# Patient Record
Sex: FEMALE | Race: BLACK OR AFRICAN AMERICAN | Employment: FULL TIME | URBAN - METROPOLITAN AREA
[De-identification: names, ages, dates, MRNs, and addresses within clinical notes are randomized per-mention and may not be internally consistent; named-entity substitution may affect disease eponyms.]

---

## 2017-01-23 ENCOUNTER — OP HISTORICAL/CONVERTED ENCOUNTER (OUTPATIENT)
Dept: OTHER | Age: 62
End: 2017-01-23

## 2017-04-26 ENCOUNTER — OP HISTORICAL/CONVERTED ENCOUNTER (OUTPATIENT)
Dept: OTHER | Age: 62
End: 2017-04-26

## 2017-05-23 ENCOUNTER — OP HISTORICAL/CONVERTED ENCOUNTER (OUTPATIENT)
Dept: OTHER | Age: 62
End: 2017-05-23

## 2017-06-16 ENCOUNTER — OP HISTORICAL/CONVERTED ENCOUNTER (OUTPATIENT)
Dept: OTHER | Age: 62
End: 2017-06-16

## 2017-08-14 ENCOUNTER — OP HISTORICAL/CONVERTED ENCOUNTER (OUTPATIENT)
Dept: OTHER | Age: 62
End: 2017-08-14

## 2018-01-02 ENCOUNTER — OP HISTORICAL/CONVERTED ENCOUNTER (OUTPATIENT)
Dept: OTHER | Age: 63
End: 2018-01-02

## 2018-02-22 ENCOUNTER — ED HISTORICAL/CONVERTED ENCOUNTER (OUTPATIENT)
Dept: OTHER | Age: 63
End: 2018-02-22

## 2018-02-24 ENCOUNTER — ED HISTORICAL/CONVERTED ENCOUNTER (OUTPATIENT)
Dept: OTHER | Age: 63
End: 2018-02-24

## 2018-06-04 ENCOUNTER — OP HISTORICAL/CONVERTED ENCOUNTER (OUTPATIENT)
Dept: OTHER | Age: 63
End: 2018-06-04

## 2018-08-27 ENCOUNTER — OP HISTORICAL/CONVERTED ENCOUNTER (OUTPATIENT)
Dept: OTHER | Age: 63
End: 2018-08-27

## 2018-11-11 ENCOUNTER — ED HISTORICAL/CONVERTED ENCOUNTER (OUTPATIENT)
Dept: OTHER | Age: 63
End: 2018-11-11

## 2018-11-19 ENCOUNTER — ED HISTORICAL/CONVERTED ENCOUNTER (OUTPATIENT)
Dept: OTHER | Age: 63
End: 2018-11-19

## 2019-02-25 ENCOUNTER — OP HISTORICAL/CONVERTED ENCOUNTER (OUTPATIENT)
Dept: OTHER | Age: 64
End: 2019-02-25

## 2019-03-28 ENCOUNTER — ED HISTORICAL/CONVERTED ENCOUNTER (OUTPATIENT)
Dept: OTHER | Age: 64
End: 2019-03-28

## 2019-06-06 ENCOUNTER — HOSPITAL ENCOUNTER (INPATIENT)
Age: 64
LOS: 7 days | Discharge: HOME | DRG: 329 | End: 2019-06-13
Attending: EMERGENCY MEDICINE | Admitting: SURGERY
Payer: COMMERCIAL

## 2019-06-06 ENCOUNTER — ANESTHESIA (OUTPATIENT)
Dept: SURGERY | Age: 64
DRG: 329 | End: 2019-06-06
Payer: COMMERCIAL

## 2019-06-06 ENCOUNTER — APPOINTMENT (OUTPATIENT)
Dept: CT IMAGING | Age: 64
DRG: 329 | End: 2019-06-06
Attending: PHYSICIAN ASSISTANT
Payer: COMMERCIAL

## 2019-06-06 ENCOUNTER — APPOINTMENT (OUTPATIENT)
Dept: GENERAL RADIOLOGY | Age: 64
DRG: 329 | End: 2019-06-06
Attending: PHYSICIAN ASSISTANT
Payer: COMMERCIAL

## 2019-06-06 ENCOUNTER — ANESTHESIA EVENT (OUTPATIENT)
Dept: SURGERY | Age: 64
DRG: 329 | End: 2019-06-06
Payer: COMMERCIAL

## 2019-06-06 DIAGNOSIS — K27.5 PERFORATED ULCER (HCC): Primary | ICD-10-CM

## 2019-06-06 DIAGNOSIS — K25.5 CHRONIC GASTRIC ULCER WITH PERFORATION (HCC): ICD-10-CM

## 2019-06-06 DIAGNOSIS — E44.0 MODERATE PROTEIN-CALORIE MALNUTRITION (HCC): ICD-10-CM

## 2019-06-06 LAB
ALBUMIN SERPL-MCNC: 3.1 G/DL (ref 3.5–5)
ALBUMIN/GLOB SERPL: 0.7 {RATIO} (ref 1.1–2.2)
ALP SERPL-CCNC: 69 U/L (ref 45–117)
ALT SERPL-CCNC: 14 U/L (ref 12–78)
ANION GAP SERPL CALC-SCNC: 8 MMOL/L (ref 5–15)
AST SERPL-CCNC: 17 U/L (ref 15–37)
ATRIAL RATE: 102 BPM
BASOPHILS # BLD: 0 K/UL (ref 0–0.1)
BASOPHILS NFR BLD: 0 % (ref 0–1)
BILIRUB SERPL-MCNC: 0.7 MG/DL (ref 0.2–1)
BUN SERPL-MCNC: 8 MG/DL (ref 6–20)
BUN/CREAT SERPL: 12 (ref 12–20)
CALCIUM SERPL-MCNC: 9.6 MG/DL (ref 8.5–10.1)
CALCULATED P AXIS, ECG09: 66 DEGREES
CALCULATED R AXIS, ECG10: -26 DEGREES
CALCULATED T AXIS, ECG11: 63 DEGREES
CHLORIDE SERPL-SCNC: 100 MMOL/L (ref 97–108)
CO2 SERPL-SCNC: 27 MMOL/L (ref 21–32)
COMMENT, HOLDF: NORMAL
CREAT SERPL-MCNC: 0.67 MG/DL (ref 0.55–1.02)
DIAGNOSIS, 93000: NORMAL
DIFFERENTIAL METHOD BLD: ABNORMAL
EOSINOPHIL # BLD: 0 K/UL (ref 0–0.4)
EOSINOPHIL NFR BLD: 0 % (ref 0–7)
ERYTHROCYTE [DISTWIDTH] IN BLOOD BY AUTOMATED COUNT: 12.7 % (ref 11.5–14.5)
GLOBULIN SER CALC-MCNC: 4.3 G/DL (ref 2–4)
GLUCOSE SERPL-MCNC: 160 MG/DL (ref 65–100)
HCT VFR BLD AUTO: 43.2 % (ref 35–47)
HGB BLD-MCNC: 14 G/DL (ref 11.5–16)
IMM GRANULOCYTES # BLD AUTO: 0 K/UL (ref 0–0.04)
IMM GRANULOCYTES NFR BLD AUTO: 0 % (ref 0–0.5)
LACTATE BLD-SCNC: 1.82 MMOL/L (ref 0.4–2)
LYMPHOCYTES # BLD: 0.4 K/UL (ref 0.8–3.5)
LYMPHOCYTES NFR BLD: 4 % (ref 12–49)
MCH RBC QN AUTO: 29.4 PG (ref 26–34)
MCHC RBC AUTO-ENTMCNC: 32.4 G/DL (ref 30–36.5)
MCV RBC AUTO: 90.8 FL (ref 80–99)
MONOCYTES # BLD: 0.2 K/UL (ref 0–1)
MONOCYTES NFR BLD: 2 % (ref 5–13)
NEUTS SEG # BLD: 8.9 K/UL (ref 1.8–8)
NEUTS SEG NFR BLD: 94 % (ref 32–75)
NRBC # BLD: 0 K/UL (ref 0–0.01)
NRBC BLD-RTO: 0 PER 100 WBC
P-R INTERVAL, ECG05: 138 MS
PLATELET # BLD AUTO: 283 K/UL (ref 150–400)
PMV BLD AUTO: 9.3 FL (ref 8.9–12.9)
POTASSIUM SERPL-SCNC: 4.4 MMOL/L (ref 3.5–5.1)
PROT SERPL-MCNC: 7.4 G/DL (ref 6.4–8.2)
Q-T INTERVAL, ECG07: 332 MS
QRS DURATION, ECG06: 88 MS
QTC CALCULATION (BEZET), ECG08: 432 MS
RBC # BLD AUTO: 4.76 M/UL (ref 3.8–5.2)
RBC MORPH BLD: ABNORMAL
SAMPLES BEING HELD,HOLD: NORMAL
SODIUM SERPL-SCNC: 135 MMOL/L (ref 136–145)
VENTRICULAR RATE, ECG03: 102 BPM
WBC # BLD AUTO: 9.5 K/UL (ref 3.6–11)

## 2019-06-06 PROCEDURE — 77030020782 HC GWN BAIR PAWS FLX 3M -B

## 2019-06-06 PROCEDURE — 96374 THER/PROPH/DIAG INJ IV PUSH: CPT

## 2019-06-06 PROCEDURE — 88305 TISSUE EXAM BY PATHOLOGIST: CPT

## 2019-06-06 PROCEDURE — 71045 X-RAY EXAM CHEST 1 VIEW: CPT

## 2019-06-06 PROCEDURE — 77030037032 HC INSRT SCIS CLICKLLINE DISP STOR -B: Performed by: SURGERY

## 2019-06-06 PROCEDURE — 65270000029 HC RM PRIVATE

## 2019-06-06 PROCEDURE — 74011000258 HC RX REV CODE- 258: Performed by: SURGERY

## 2019-06-06 PROCEDURE — 74011250636 HC RX REV CODE- 250/636

## 2019-06-06 PROCEDURE — 77030008771 HC TU NG SALEM SUMP -A: Performed by: ANESTHESIOLOGY

## 2019-06-06 PROCEDURE — 77030031139 HC SUT VCRL2 J&J -A: Performed by: SURGERY

## 2019-06-06 PROCEDURE — 76210000016 HC OR PH I REC 1 TO 1.5 HR: Performed by: SURGERY

## 2019-06-06 PROCEDURE — C9290 INJ, BUPIVACAINE LIPOSOME: HCPCS | Performed by: SURGERY

## 2019-06-06 PROCEDURE — 99285 EMERGENCY DEPT VISIT HI MDM: CPT

## 2019-06-06 PROCEDURE — 77030020061 HC IV BLD WRMR ADMIN SET 3M -B: Performed by: ANESTHESIOLOGY

## 2019-06-06 PROCEDURE — 77030011640 HC PAD GRND REM COVD -A: Performed by: SURGERY

## 2019-06-06 PROCEDURE — 77030013079 HC BLNKT BAIR HGGR 3M -A: Performed by: SURGERY

## 2019-06-06 PROCEDURE — 77030008756 HC TU IRR SUC STRY -B: Performed by: SURGERY

## 2019-06-06 PROCEDURE — 36415 COLL VENOUS BLD VENIPUNCTURE: CPT

## 2019-06-06 PROCEDURE — 76060000034 HC ANESTHESIA 1.5 TO 2 HR: Performed by: SURGERY

## 2019-06-06 PROCEDURE — 3E1M38Z IRRIGATION OF PERITONEAL CAVITY USING IRRIGATING SUBSTANCE, PERCUTANEOUS APPROACH: ICD-10-PCS | Performed by: SURGERY

## 2019-06-06 PROCEDURE — 93005 ELECTROCARDIOGRAM TRACING: CPT

## 2019-06-06 PROCEDURE — 96375 TX/PRO/DX INJ NEW DRUG ADDON: CPT

## 2019-06-06 PROCEDURE — 77030018836 HC SOL IRR NACL ICUM -A: Performed by: SURGERY

## 2019-06-06 PROCEDURE — 74011250636 HC RX REV CODE- 250/636: Performed by: ANESTHESIOLOGY

## 2019-06-06 PROCEDURE — 76010000153 HC OR TIME 1.5 TO 2 HR: Performed by: SURGERY

## 2019-06-06 PROCEDURE — C9113 INJ PANTOPRAZOLE SODIUM, VIA: HCPCS | Performed by: SURGERY

## 2019-06-06 PROCEDURE — 85025 COMPLETE CBC W/AUTO DIFF WBC: CPT

## 2019-06-06 PROCEDURE — 83605 ASSAY OF LACTIC ACID: CPT

## 2019-06-06 PROCEDURE — 74011000250 HC RX REV CODE- 250

## 2019-06-06 PROCEDURE — 77030020263 HC SOL INJ SOD CL0.9% LFCR 1000ML: Performed by: SURGERY

## 2019-06-06 PROCEDURE — 77030020747 HC TU INSUF ENDOSC TELE -A: Performed by: SURGERY

## 2019-06-06 PROCEDURE — 77030032490 HC SLV COMPR SCD KNE COVD -B

## 2019-06-06 PROCEDURE — 77030002996 HC SUT SLK J&J -A: Performed by: SURGERY

## 2019-06-06 PROCEDURE — 74176 CT ABD & PELVIS W/O CONTRAST: CPT

## 2019-06-06 PROCEDURE — 77030034850: Performed by: SURGERY

## 2019-06-06 PROCEDURE — 77030002916 HC SUT ETHLN J&J -A: Performed by: SURGERY

## 2019-06-06 PROCEDURE — 0DU947Z SUPPLEMENT DUODENUM WITH AUTOLOGOUS TISSUE SUBSTITUTE, PERCUTANEOUS ENDOSCOPIC APPROACH: ICD-10-PCS | Performed by: SURGERY

## 2019-06-06 PROCEDURE — 77030012405 HC DRN WND ADLR -A: Performed by: SURGERY

## 2019-06-06 PROCEDURE — 77030022704 HC SUT VLOC COVD -B: Performed by: SURGERY

## 2019-06-06 PROCEDURE — 77030026438 HC STYL ET INTUB CARD -A: Performed by: NURSE ANESTHETIST, CERTIFIED REGISTERED

## 2019-06-06 PROCEDURE — 77030013079 HC BLNKT BAIR HGGR 3M -A: Performed by: ANESTHESIOLOGY

## 2019-06-06 PROCEDURE — 96376 TX/PRO/DX INJ SAME DRUG ADON: CPT

## 2019-06-06 PROCEDURE — 77030034628 HC LIGASURE LAP SEAL DIV MD COVD -F: Performed by: SURGERY

## 2019-06-06 PROCEDURE — 74011250636 HC RX REV CODE- 250/636: Performed by: SURGERY

## 2019-06-06 PROCEDURE — 74011000250 HC RX REV CODE- 250: Performed by: SURGERY

## 2019-06-06 PROCEDURE — 77030019908 HC STETH ESOPH SIMS -A: Performed by: ANESTHESIOLOGY

## 2019-06-06 PROCEDURE — 77030013567 HC DRN WND RESERV BARD -A: Performed by: SURGERY

## 2019-06-06 PROCEDURE — 77030008606 HC TRCR ENDOSC KII AMR -B: Performed by: SURGERY

## 2019-06-06 PROCEDURE — 74011250636 HC RX REV CODE- 250/636: Performed by: PHYSICIAN ASSISTANT

## 2019-06-06 PROCEDURE — 77030008684 HC TU ET CUF COVD -B: Performed by: ANESTHESIOLOGY

## 2019-06-06 PROCEDURE — 96361 HYDRATE IV INFUSION ADD-ON: CPT

## 2019-06-06 PROCEDURE — 77030018684: Performed by: SURGERY

## 2019-06-06 PROCEDURE — 77030002933 HC SUT MCRYL J&J -A: Performed by: SURGERY

## 2019-06-06 PROCEDURE — 80053 COMPREHEN METABOLIC PANEL: CPT

## 2019-06-06 RX ORDER — PHENYLEPHRINE HCL IN 0.9% NACL 0.4MG/10ML
SYRINGE (ML) INTRAVENOUS AS NEEDED
Status: DISCONTINUED | OUTPATIENT
Start: 2019-06-06 | End: 2019-06-06 | Stop reason: HOSPADM

## 2019-06-06 RX ORDER — ONDANSETRON 4 MG/1
4 TABLET, ORALLY DISINTEGRATING ORAL
COMMUNITY
End: 2019-06-13

## 2019-06-06 RX ORDER — CEFAZOLIN SODIUM/WATER 2 G/20 ML
2 SYRINGE (ML) INTRAVENOUS
Status: COMPLETED | OUTPATIENT
Start: 2019-06-06 | End: 2019-06-06

## 2019-06-06 RX ORDER — TRIAMTERENE AND HYDROCHLOROTHIAZIDE 37.5; 25 MG/1; MG/1
1 CAPSULE ORAL DAILY
COMMUNITY

## 2019-06-06 RX ORDER — SODIUM CHLORIDE, SODIUM LACTATE, POTASSIUM CHLORIDE, CALCIUM CHLORIDE 600; 310; 30; 20 MG/100ML; MG/100ML; MG/100ML; MG/100ML
125 INJECTION, SOLUTION INTRAVENOUS CONTINUOUS
Status: DISPENSED | OUTPATIENT
Start: 2019-06-06 | End: 2019-06-11

## 2019-06-06 RX ORDER — SUCRALFATE 1 G/10ML
1 SUSPENSION ORAL 4 TIMES DAILY
COMMUNITY

## 2019-06-06 RX ORDER — HYDROMORPHONE HCL/0.9% NACL/PF 0.5 MG/ML
PLASTIC BAG, INJECTION (ML) INTRAVENOUS
Status: DISCONTINUED | OUTPATIENT
Start: 2019-06-06 | End: 2019-06-12

## 2019-06-06 RX ORDER — PANTOPRAZOLE SODIUM 40 MG/1
40 TABLET, DELAYED RELEASE ORAL DAILY
COMMUNITY
End: 2019-06-06

## 2019-06-06 RX ORDER — HYDROMORPHONE HCL IN 0.9% NACL 15 MG/30ML
PATIENT CONTROLLED ANALGESIA VIAL INTRAVENOUS CONTINUOUS
Status: DISCONTINUED | OUTPATIENT
Start: 2019-06-06 | End: 2019-06-06

## 2019-06-06 RX ORDER — SODIUM CHLORIDE 0.9 % (FLUSH) 0.9 %
5-40 SYRINGE (ML) INJECTION AS NEEDED
Status: DISCONTINUED | OUTPATIENT
Start: 2019-06-06 | End: 2019-06-06 | Stop reason: HOSPADM

## 2019-06-06 RX ORDER — HYDROMORPHONE HYDROCHLORIDE 2 MG/ML
1 INJECTION, SOLUTION INTRAMUSCULAR; INTRAVENOUS; SUBCUTANEOUS
Status: COMPLETED | OUTPATIENT
Start: 2019-06-06 | End: 2019-06-06

## 2019-06-06 RX ORDER — FENTANYL CITRATE 50 UG/ML
INJECTION, SOLUTION INTRAMUSCULAR; INTRAVENOUS AS NEEDED
Status: DISCONTINUED | OUTPATIENT
Start: 2019-06-06 | End: 2019-06-06 | Stop reason: HOSPADM

## 2019-06-06 RX ORDER — HYDROMORPHONE HYDROCHLORIDE 1 MG/ML
.25-1 INJECTION, SOLUTION INTRAMUSCULAR; INTRAVENOUS; SUBCUTANEOUS
Status: DISCONTINUED | OUTPATIENT
Start: 2019-06-06 | End: 2019-06-06 | Stop reason: HOSPADM

## 2019-06-06 RX ORDER — SUCCINYLCHOLINE CHLORIDE 20 MG/ML
INJECTION INTRAMUSCULAR; INTRAVENOUS AS NEEDED
Status: DISCONTINUED | OUTPATIENT
Start: 2019-06-06 | End: 2019-06-06 | Stop reason: HOSPADM

## 2019-06-06 RX ORDER — TRAMADOL HYDROCHLORIDE 50 MG/1
50 TABLET ORAL
COMMUNITY

## 2019-06-06 RX ORDER — ROCURONIUM BROMIDE 10 MG/ML
INJECTION, SOLUTION INTRAVENOUS AS NEEDED
Status: DISCONTINUED | OUTPATIENT
Start: 2019-06-06 | End: 2019-06-06 | Stop reason: HOSPADM

## 2019-06-06 RX ORDER — ONDANSETRON 2 MG/ML
4 INJECTION INTRAMUSCULAR; INTRAVENOUS
Status: COMPLETED | OUTPATIENT
Start: 2019-06-06 | End: 2019-06-06

## 2019-06-06 RX ORDER — ROSUVASTATIN CALCIUM 10 MG/1
10 TABLET, COATED ORAL
COMMUNITY

## 2019-06-06 RX ORDER — PAROXETINE 10 MG/1
10 TABLET, FILM COATED ORAL DAILY
COMMUNITY

## 2019-06-06 RX ORDER — ONDANSETRON 2 MG/ML
INJECTION INTRAMUSCULAR; INTRAVENOUS AS NEEDED
Status: DISCONTINUED | OUTPATIENT
Start: 2019-06-06 | End: 2019-06-06 | Stop reason: HOSPADM

## 2019-06-06 RX ORDER — GABAPENTIN 300 MG/1
300 CAPSULE ORAL
COMMUNITY

## 2019-06-06 RX ORDER — SODIUM CHLORIDE, SODIUM LACTATE, POTASSIUM CHLORIDE, CALCIUM CHLORIDE 600; 310; 30; 20 MG/100ML; MG/100ML; MG/100ML; MG/100ML
50 INJECTION, SOLUTION INTRAVENOUS CONTINUOUS
Status: DISCONTINUED | OUTPATIENT
Start: 2019-06-06 | End: 2019-06-06 | Stop reason: HOSPADM

## 2019-06-06 RX ORDER — CALCIUM CARBONATE 500(1250)
1 TABLET ORAL DAILY
COMMUNITY

## 2019-06-06 RX ORDER — SODIUM CHLORIDE, SODIUM LACTATE, POTASSIUM CHLORIDE, CALCIUM CHLORIDE 600; 310; 30; 20 MG/100ML; MG/100ML; MG/100ML; MG/100ML
INJECTION, SOLUTION INTRAVENOUS
Status: DISCONTINUED | OUTPATIENT
Start: 2019-06-06 | End: 2019-06-06 | Stop reason: HOSPADM

## 2019-06-06 RX ORDER — SODIUM CHLORIDE, SODIUM LACTATE, POTASSIUM CHLORIDE, CALCIUM CHLORIDE 600; 310; 30; 20 MG/100ML; MG/100ML; MG/100ML; MG/100ML
125 INJECTION, SOLUTION INTRAVENOUS CONTINUOUS
Status: DISCONTINUED | OUTPATIENT
Start: 2019-06-06 | End: 2019-06-06 | Stop reason: HOSPADM

## 2019-06-06 RX ORDER — DICLOFENAC SODIUM 10 MG/G
4 GEL TOPICAL
COMMUNITY

## 2019-06-06 RX ORDER — SUCRALFATE 1 G/1
TABLET ORAL 4 TIMES DAILY
COMMUNITY
End: 2019-06-06

## 2019-06-06 RX ORDER — PROPOFOL 10 MG/ML
INJECTION, EMULSION INTRAVENOUS AS NEEDED
Status: DISCONTINUED | OUTPATIENT
Start: 2019-06-06 | End: 2019-06-06 | Stop reason: HOSPADM

## 2019-06-06 RX ORDER — MIDAZOLAM HYDROCHLORIDE 1 MG/ML
INJECTION, SOLUTION INTRAMUSCULAR; INTRAVENOUS AS NEEDED
Status: DISCONTINUED | OUTPATIENT
Start: 2019-06-06 | End: 2019-06-06 | Stop reason: HOSPADM

## 2019-06-06 RX ORDER — ESOMEPRAZOLE MAGNESIUM 40 MG/1
40 CAPSULE, DELAYED RELEASE ORAL DAILY
COMMUNITY
End: 2019-06-13

## 2019-06-06 RX ORDER — MELATONIN
1000 DAILY
COMMUNITY

## 2019-06-06 RX ORDER — DEXAMETHASONE SODIUM PHOSPHATE 4 MG/ML
INJECTION, SOLUTION INTRA-ARTICULAR; INTRALESIONAL; INTRAMUSCULAR; INTRAVENOUS; SOFT TISSUE AS NEEDED
Status: DISCONTINUED | OUTPATIENT
Start: 2019-06-06 | End: 2019-06-06 | Stop reason: HOSPADM

## 2019-06-06 RX ORDER — ERGOCALCIFEROL 1.25 MG/1
2000 CAPSULE ORAL
COMMUNITY
End: 2019-06-06

## 2019-06-06 RX ORDER — SODIUM CHLORIDE 0.9 % (FLUSH) 0.9 %
5-40 SYRINGE (ML) INJECTION EVERY 8 HOURS
Status: DISCONTINUED | OUTPATIENT
Start: 2019-06-06 | End: 2019-06-06 | Stop reason: HOSPADM

## 2019-06-06 RX ADMIN — HYDROMORPHONE HYDROCHLORIDE 1 MG: 2 INJECTION INTRAMUSCULAR; INTRAVENOUS; SUBCUTANEOUS at 14:36

## 2019-06-06 RX ADMIN — SODIUM CHLORIDE, SODIUM LACTATE, POTASSIUM CHLORIDE, AND CALCIUM CHLORIDE 125 ML/HR: 600; 310; 30; 20 INJECTION, SOLUTION INTRAVENOUS at 21:36

## 2019-06-06 RX ADMIN — Medication 100 MCG: at 19:21

## 2019-06-06 RX ADMIN — SODIUM CHLORIDE, SODIUM LACTATE, POTASSIUM CHLORIDE, AND CALCIUM CHLORIDE: 600; 310; 30; 20 INJECTION, SOLUTION INTRAVENOUS at 20:15

## 2019-06-06 RX ADMIN — MIDAZOLAM HYDROCHLORIDE 1 MG: 1 INJECTION, SOLUTION INTRAMUSCULAR; INTRAVENOUS at 19:02

## 2019-06-06 RX ADMIN — HYDROMORPHONE HYDROCHLORIDE 1 MG: 2 INJECTION INTRAMUSCULAR; INTRAVENOUS; SUBCUTANEOUS at 16:29

## 2019-06-06 RX ADMIN — Medication 100 MCG: at 20:01

## 2019-06-06 RX ADMIN — FENTANYL CITRATE 25 MCG: 50 INJECTION, SOLUTION INTRAMUSCULAR; INTRAVENOUS at 19:41

## 2019-06-06 RX ADMIN — Medication 100 MCG: at 19:46

## 2019-06-06 RX ADMIN — Medication 2 G: at 18:52

## 2019-06-06 RX ADMIN — Medication 100 MCG: at 19:18

## 2019-06-06 RX ADMIN — Medication 100 MCG: at 19:49

## 2019-06-06 RX ADMIN — Medication: at 21:39

## 2019-06-06 RX ADMIN — PROPOFOL 100 MG: 10 INJECTION, EMULSION INTRAVENOUS at 19:08

## 2019-06-06 RX ADMIN — FENTANYL CITRATE 75 MCG: 50 INJECTION, SOLUTION INTRAMUSCULAR; INTRAVENOUS at 19:08

## 2019-06-06 RX ADMIN — Medication 100 MCG: at 19:55

## 2019-06-06 RX ADMIN — ONDANSETRON 4 MG: 2 INJECTION INTRAMUSCULAR; INTRAVENOUS at 19:53

## 2019-06-06 RX ADMIN — SODIUM CHLORIDE, SODIUM LACTATE, POTASSIUM CHLORIDE, CALCIUM CHLORIDE: 600; 310; 30; 20 INJECTION, SOLUTION INTRAVENOUS at 19:00

## 2019-06-06 RX ADMIN — Medication 100 MCG: at 19:31

## 2019-06-06 RX ADMIN — MIDAZOLAM HYDROCHLORIDE 1 MG: 1 INJECTION, SOLUTION INTRAMUSCULAR; INTRAVENOUS at 19:00

## 2019-06-06 RX ADMIN — SUCCINYLCHOLINE CHLORIDE 80 MG: 20 INJECTION INTRAMUSCULAR; INTRAVENOUS at 19:08

## 2019-06-06 RX ADMIN — ROCURONIUM BROMIDE 10 MG: 10 INJECTION, SOLUTION INTRAVENOUS at 19:30

## 2019-06-06 RX ADMIN — Medication 100 MCG: at 19:36

## 2019-06-06 RX ADMIN — FENTANYL CITRATE 50 MCG: 50 INJECTION, SOLUTION INTRAMUSCULAR; INTRAVENOUS at 19:31

## 2019-06-06 RX ADMIN — Medication 100 MCG: at 19:10

## 2019-06-06 RX ADMIN — ONDANSETRON 4 MG: 2 INJECTION INTRAMUSCULAR; INTRAVENOUS at 14:30

## 2019-06-06 RX ADMIN — DEXAMETHASONE SODIUM PHOSPHATE 4 MG: 4 INJECTION, SOLUTION INTRA-ARTICULAR; INTRALESIONAL; INTRAMUSCULAR; INTRAVENOUS; SOFT TISSUE at 19:50

## 2019-06-06 RX ADMIN — Medication 200 MCG: at 19:15

## 2019-06-06 RX ADMIN — Medication 100 MCG: at 20:06

## 2019-06-06 RX ADMIN — Medication 100 MCG: at 19:42

## 2019-06-06 RX ADMIN — SODIUM CHLORIDE 1000 ML: 900 INJECTION, SOLUTION INTRAVENOUS at 16:30

## 2019-06-06 RX ADMIN — SODIUM CHLORIDE 40 MG: 9 INJECTION INTRAMUSCULAR; INTRAVENOUS; SUBCUTANEOUS at 22:00

## 2019-06-06 RX ADMIN — FENTANYL CITRATE 50 MCG: 50 INJECTION, SOLUTION INTRAMUSCULAR; INTRAVENOUS at 19:52

## 2019-06-06 RX ADMIN — PROPOFOL 30 MG: 10 INJECTION, EMULSION INTRAVENOUS at 19:42

## 2019-06-06 RX ADMIN — SODIUM CHLORIDE, SODIUM LACTATE, POTASSIUM CHLORIDE, AND CALCIUM CHLORIDE 125 ML/HR: 600; 310; 30; 20 INJECTION, SOLUTION INTRAVENOUS at 17:31

## 2019-06-06 RX ADMIN — PIPERACILLIN SODIUM,TAZOBACTAM SODIUM 3.38 G: 3; .375 INJECTION, POWDER, FOR SOLUTION INTRAVENOUS at 23:58

## 2019-06-06 RX ADMIN — ROCURONIUM BROMIDE 20 MG: 10 INJECTION, SOLUTION INTRAVENOUS at 19:27

## 2019-06-06 NOTE — PROGRESS NOTES
Events reviewed. Discussed with Dr. Gregorio Weaver and JOAQUIN Chacon. Has 2.1cm greater curvature gastric perforation. OR for repair, gram patch. PPI BID, pain control.      Shanti Heaton MD

## 2019-06-06 NOTE — ED TRIAGE NOTES
Pt arrives via EMS from [facility] where she had an endoscopy done. Findings were a non-bleeding cratered gastric ulcer. Pt stated that this pain started last night after the prep for the endoscopy.

## 2019-06-06 NOTE — PROGRESS NOTES
Admission Medication Reconciliation:    Comments/Recommendations:  -Medication history obtained in the emergency department (room 12)  -Confirmed NKDA, added preferred pharmacy location  -Med rec completed from list provided by family which was confirmed with phone call to [facility] in [place] since there was no RxQuery on file ([phone])    Medications added: Calcium, Zofran prn, diclofenac gel, gabapentin PRN, tramadol PRN  Medications removed: Pantoprazole  Medications changed: Nexium dose from 20 mg to 40 mg, Carafate from tablet to liquid, ergocalciferol to cholecalciferol    Information obtained from: Patient, med list, phone call to [facility], no RxQuery    Significant PMH/Disease States:   Past Medical History:   Diagnosis Date    Dental bridge present     Gastric ulcer 06/06/2019    Generalized OA     Hyperlipidemia     Hypertension        Chief Complaint for this Admission:    Chief Complaint   Patient presents with    Abdominal Pain    Ulcer       Allergies:  Patient has no known allergies. Prior to Admission Medications:   Prior to Admission Medications   Prescriptions Last Dose Informant Patient Reported? Taking? PARoxetine (PAXIL) 10 mg tablet 6/5/2019 at AM  Yes Yes   Sig: Take 10 mg by mouth daily. calcium carbonate (OS-DAVID) 500 mg calcium (1,250 mg) tablet 6/5/2019 at AM  Yes Yes   Sig: Take 1 Tab by mouth daily. cholecalciferol (VITAMIN D3) 1,000 unit tablet 6/5/2019 at AM  Yes Yes   Sig: Take 1,000 Units by mouth daily. diclofenac (VOLTAREN) 1 % gel 5/30/2019 at Unknown time  Yes Yes   Sig: Apply 4 g to affected area four (4) times daily as needed for Pain.   esomeprazole (NEXIUM) 40 mg capsule 6/5/2019 at AM  Yes Yes   Sig: Take 40 mg by mouth daily. gabapentin (NEURONTIN) 300 mg capsule 5/30/2019 at Unknown time  Yes Yes   Sig: Take 300 mg by mouth nightly as needed for Other (neuropathic pain).    ondansetron (ZOFRAN ODT) 4 mg disintegrating tablet 6/6/2019 at Unknown time  Yes Yes Sig: Take 4 mg by mouth every eight (8) hours as needed for Nausea. rosuvastatin (CRESTOR) 10 mg tablet 6/5/2019 at bedtime  Yes Yes   Sig: Take 10 mg by mouth nightly. sucralfate (CARAFATE) 100 mg/mL suspension 6/5/2019 at Unknown time  Yes Yes   Sig: Take 1 g by mouth four (4) times daily. traMADol (ULTRAM) 50 mg tablet 5/30/2019 at Unknown time  Yes Yes   Sig: Take 50 mg by mouth daily as needed for Pain (severe pain). triamterene-hydroCHLOROthiazide (DYAZIDE) 37.5-25 mg per capsule 6/6/2019 at AM  Yes Yes   Sig: Take 1 Cap by mouth daily.       Facility-Administered Medications: None     Thank you,  Yemi Fletcher, PHARMD

## 2019-06-06 NOTE — ANESTHESIA PREPROCEDURE EVALUATION
Relevant Problems   No relevant active problems       Anesthetic History   No history of anesthetic complications            Review of Systems / Medical History  Patient summary reviewed, nursing notes reviewed and pertinent labs reviewed    Pulmonary  Within defined limits                 Neuro/Psych   Within defined limits           Cardiovascular    Hypertension: well controlled              Exercise tolerance: >4 METS     GI/Hepatic/Renal           PUD    Comments: Recent upper and lower GI scopes Endo/Other        Arthritis     Other Findings              Physical Exam    Airway  Mallampati: II    Neck ROM: normal range of motion   Mouth opening: Normal     Cardiovascular    Rhythm: regular  Rate: normal         Dental  No notable dental hx       Pulmonary  Breath sounds clear to auscultation               Abdominal  GI exam deferred       Other Findings            Anesthetic Plan    ASA: 2, emergent  Anesthesia type: general          Induction: Intravenous  Anesthetic plan and risks discussed with: Patient

## 2019-06-06 NOTE — ED PROVIDER NOTES
Markell Turner is a 75 y.o. female who presents via EMS to the ED with a c/o upper abd pain, starting last night 6/5/19 and worse today 6/6/19. Pt notes she started with pain while doing her prep for a upper and lower endoscopy. PT underwent the endoscopy with Dr. Marion Magana and she states her pain worsened following the endoscopy. Pt's paperwork notes an ulcer with perforation through the stomach lining into the abdomen. Pt's  states she had lost 40lbs over the last several months, pt denies f/c, n/v/d or urinary sx. Pt notes her pain is radiating to her chest.    PCP: Aidee Adorno MD  PMHx significant for: Past Medical History:  No date: Dental bridge present  06/06/2019: Gastric ulcer  No date: Generalized OA  No date: Hyperlipidemia  No date: Hypertension  PSHx significant for: Past Surgical History:  No date: HX ENDOSCOPY  Social Hx: Tobacco: denies  EtOH: denies  Illicit drug use: denies    There are no further complaints or symptoms at this time. Past Medical History:   Diagnosis Date    Dental bridge present     Gastric ulcer 06/06/2019    Generalized OA     Hyperlipidemia     Hypertension        Past Surgical History:   Procedure Laterality Date    HX ENDOSCOPY           History reviewed. No pertinent family history.     Social History     Socioeconomic History    Marital status:      Spouse name: Not on file    Number of children: Not on file    Years of education: Not on file    Highest education level: Not on file   Occupational History    Not on file   Social Needs    Financial resource strain: Not on file    Food insecurity:     Worry: Not on file     Inability: Not on file    Transportation needs:     Medical: Not on file     Non-medical: Not on file   Tobacco Use    Smoking status: Never Smoker    Smokeless tobacco: Never Used   Substance and Sexual Activity    Alcohol use: Not Currently    Drug use: Not Currently    Sexual activity: Not on file   Lifestyle    Physical activity:     Days per week: Not on file     Minutes per session: Not on file    Stress: Not on file   Relationships    Social connections:     Talks on phone: Not on file     Gets together: Not on file     Attends Zoroastrianism service: Not on file     Active member of club or organization: Not on file     Attends meetings of clubs or organizations: Not on file     Relationship status: Not on file    Intimate partner violence:     Fear of current or ex partner: Not on file     Emotionally abused: Not on file     Physically abused: Not on file     Forced sexual activity: Not on file   Other Topics Concern    Not on file   Social History Narrative    Not on file         ALLERGIES: Patient has no known allergies. Review of Systems   Constitutional: Negative for chills and fever. HENT: Negative for congestion, rhinorrhea, sneezing and sore throat. Eyes: Negative for redness and visual disturbance. Respiratory: Negative for shortness of breath. Cardiovascular: Positive for chest pain. Negative for leg swelling. Gastrointestinal: Positive for abdominal pain. Negative for nausea and vomiting. Genitourinary: Negative for difficulty urinating and frequency. Musculoskeletal: Negative for back pain, myalgias and neck stiffness. Skin: Negative for rash. Neurological: Negative for dizziness, syncope, weakness and headaches. Hematological: Negative for adenopathy.        Patient Vitals for the past 12 hrs:   Temp Pulse Resp BP SpO2   06/06/19 1714 98.9 °F (37.2 °C) (!) 101 20 141/66 96 %   06/06/19 1615  (!) 101 25 154/85    06/06/19 1600  99 23 149/73 100 %   06/06/19 1545  99 22 141/73 100 %   06/06/19 1530  98 24 145/72    06/06/19 1500  (!) 101 27 134/70    06/06/19 1445  100 23 146/79    06/06/19 1430  96 (!) 34 149/68    06/06/19 1415  97 24     06/06/19 1400 98.4 °F (36.9 °C) 81 30 136/70 92 %              Physical Exam   Constitutional: She is oriented to person, place, and time. She appears well-developed and well-nourished. No distress. Moderate discomfort   HENT:   Head: Normocephalic and atraumatic. Right Ear: External ear normal.   Left Ear: External ear normal.   Eyes: Pupils are equal, round, and reactive to light. EOM are normal.   Neck: Neck supple. Cardiovascular: Normal rate, regular rhythm, normal heart sounds and intact distal pulses. Exam reveals no gallop and no friction rub. No murmur heard. Pulmonary/Chest: Effort normal and breath sounds normal. No stridor. No respiratory distress. She has no wheezes. She has no rales. She exhibits no tenderness. Abdominal: Soft. Bowel sounds are normal. She exhibits no distension and no mass. There is tenderness. There is rebound and guarding. No hernia. Diffuse abd TTP more focal epigastric TTP with rebounding and guarding. Musculoskeletal: Normal range of motion. She exhibits no edema, tenderness or deformity. Neurological: She is alert and oriented to person, place, and time. No cranial nerve deficit. Coordination normal.   Skin: Skin is warm and dry. Capillary refill takes less than 2 seconds. No rash noted. No erythema. No pallor. Psychiatric: She has a normal mood and affect. Her behavior is normal.   Nursing note and vitals reviewed. MDM  Number of Diagnoses or Management Options  Perforated ulcer ([address]):      Amount and/or Complexity of Data Reviewed  Clinical lab tests: ordered and reviewed  Tests in the radiology section of CPT®: ordered and reviewed  Tests in the medicine section of CPT®: reviewed and ordered  Obtain history from someone other than the patient: yes (Family ems)  Review and summarize past medical records: yes  Independent visualization of images, tracings, or specimens: yes           Procedures  2:15 PM  Discussed pt, sx, hx and current findings with Travon Wick MD. He is in agreement with plan and will see pt.  Will get labs, ekg, cxr and ct chest. Will give pain medications and fluids. Will continue to monitor  [employer] IVAN Walker    3:12 PM  [employer] IVAN Walker spoke with Dr. Coleen Richards, Consult for Surgery. Discussed available diagnostic tests and clinical findings. He is in agreement with care plans as outlined. He will see pt and take pt to the [facility]. IVAN Walker      3:50 PM    pt and family updated on current findings and need for surgical intervention  [employer]. IVAN Walker    Critical Care: The reason for providing this level of medical care for this critically ill patient was due to a critical illness that impaired one or more vital organ systems such that there was a high probability of imminent or life threatening deterioration in the patients condition. This care involved high complexity decision making to assess, manipulate, and support vital system functions. Total critical care time spent exclusive of procedures:  35 min      LABS COMPLETED AND REVIEWED:  Recent Results (from the past 12 hour(s))   SAMPLES BEING HELD    Collection Time: 06/06/19  2:07 PM   Result Value Ref Range    SAMPLES BEING HELD 1 sst, 1 red, 1 pst, 1 lav, 1 bl     COMMENT        Add-on orders for these samples will be processed based on acceptable specimen integrity and analyte stability, which may vary by analyte. CBC WITH AUTOMATED DIFF    Collection Time: 06/06/19  2:07 PM   Result Value Ref Range    WBC 9.5 3.6 - 11.0 K/uL    RBC 4.76 3.80 - 5.20 M/uL    HGB 14.0 11.5 - 16.0 g/dL    HCT 43.2 35.0 - 47.0 %    MCV 90.8 80.0 - 99.0 FL    MCH 29.4 26.0 - 34.0 PG    MCHC 32.4 30.0 - 36.5 g/dL    RDW 12.7 11.5 - 14.5 %    PLATELET 382 557 - 907 K/uL    MPV 9.3 8.9 - 12.9 FL    NRBC 0.0 0  WBC    ABSOLUTE NRBC 0.00 0.00 - 0.01 K/uL    NEUTROPHILS 94 (H) 32 - 75 %    LYMPHOCYTES 4 (L) 12 - 49 %    MONOCYTES 2 (L) 5 - 13 %    EOSINOPHILS 0 0 - 7 %    BASOPHILS 0 0 - 1 %    IMMATURE GRANULOCYTES 0 0.0 - 0.5 %    ABS. NEUTROPHILS 8.9 (H) 1.8 - 8.0 K/UL    ABS.  LYMPHOCYTES 0.4 (L) 0.8 - 3.5 K/UL    ABS. MONOCYTES 0.2 0.0 - 1.0 K/UL    ABS. EOSINOPHILS 0.0 0.0 - 0.4 K/UL    ABS. BASOPHILS 0.0 0.0 - 0.1 K/UL    ABS. IMM. GRANS. 0.0 0.00 - 0.04 K/UL    DF SMEAR SCANNED      RBC COMMENTS NORMOCYTIC, NORMOCHROMIC     METABOLIC PANEL, COMPREHENSIVE    Collection Time: 06/06/19  2:07 PM   Result Value Ref Range    Sodium 135 (L) 136 - 145 mmol/L    Potassium 4.4 3.5 - 5.1 mmol/L    Chloride 100 97 - 108 mmol/L    CO2 27 21 - 32 mmol/L    Anion gap 8 5 - 15 mmol/L    Glucose 160 (H) 65 - 100 mg/dL    BUN 8 6 - 20 MG/DL    Creatinine 0.67 0.55 - 1.02 MG/DL    BUN/Creatinine ratio 12 12 - 20      GFR est AA >60 >60 ml/min/1.73m2    GFR est non-AA >60 >60 ml/min/1.73m2    Calcium 9.6 8.5 - 10.1 MG/DL    Bilirubin, total 0.7 0.2 - 1.0 MG/DL    ALT (SGPT) 14 12 - 78 U/L    AST (SGOT) 17 15 - 37 U/L    Alk. phosphatase 69 45 - 117 U/L    Protein, total 7.4 6.4 - 8.2 g/dL    Albumin 3.1 (L) 3.5 - 5.0 g/dL    Globulin 4.3 (H) 2.0 - 4.0 g/dL    A-G Ratio 0.7 (L) 1.1 - 2.2     POC LACTIC ACID    Collection Time: 06/06/19  2:31 PM   Result Value Ref Range    Lactic Acid (POC) 1.82 0.40 - 2.00 mmol/L   EKG, 12 LEAD, INITIAL    Collection Time: 06/06/19  2:55 PM   Result Value Ref Range    Ventricular Rate 102 BPM    Atrial Rate 102 BPM    P-R Interval 138 ms    QRS Duration 88 ms    Q-T Interval 332 ms    QTC Calculation (Bezet) 432 ms    Calculated P Axis 66 degrees    Calculated R Axis -26 degrees    Calculated T Axis 63 degrees    Diagnosis       Sinus tachycardia  Minimal voltage criteria for LVH, may be normal variant  Cannot rule out Anterior infarct , age undetermined  Abnormal ECG  No previous ECGs available         IMAGING COMPLETED AND REVIEWED:  The following have been ordered and reviewed:    Ct Abd Pelv Wo Cont    Result Date: 6/6/2019  EXAM: CT ABD PELV WO CONT INDICATION: concern for perforation after endoscopy COMPARISON: Portable chest radiograph from June 6, 2019 CONTRAST:  None. TECHNIQUE: Thin axial images were obtained through the abdomen and pelvis. Coronal and sagittal reconstructions were generated. Oral contrast was not administered. CT dose reduction was achieved through use of a standardized protocol tailored for this examination and automatic exposure control for dose modulation. The absence of intravenous contrast material reduces the sensitivity for evaluation of the solid parenchymal organs of the abdomen. FINDINGS: LUNG BASES: Bilateral dependent atelectasis, left greater than right. INCIDENTALLY IMAGED HEART AND MEDIASTINUM: Unremarkable. LIVER: No mass or biliary dilatation. GALLBLADDER: Unremarkable. SPLEEN: No mass. PANCREAS: No mass or ductal dilatation. ADRENALS: Unremarkable. KIDNEYS/URETERS: No mass, calculus, or hydronephrosis. STOMACH: Decompressed, with diffuse wall thickening. Large defect along the anterior wall of the gastric body. SMALL BOWEL: No dilatation or wall thickening. COLON: No dilatation or wall thickening. APPENDIX: Not visualized. PERITONEUM: Moderate pneumoperitoneum and small ascites. RETROPERITONEUM: No lymphadenopathy or aortic aneurysm. REPRODUCTIVE ORGANS: Normal uterus and ovaries. URINARY BLADDER: No mass or calculus. BONES: No destructive bone lesion. ADDITIONAL COMMENTS: N/A     IMPRESSION: Large perforated gastric ulcer along the anterior wall of the gastric body, with pneumoperitoneum and free fluid. The findings were called to Tello Huang on 6/6/2019 at 3:58 PM by Dr. Blanca Medina. [address] [place] [address]    Result Date: 6/6/2019  INDICATION: Increased abdominal pain and chest pain following endoscopy. FINDINGS: AP portable imaging of the chest performed at 2:39 PM demonstrates a normal cardiomediastinal silhouette. The lungs are clear bilaterally. There is moderate pneumoperitoneum. No significant osseous abnormalities are seen. IMPRESSION: No evidence of acute cardiopulmonary process. Moderate pneumoperitoneum.  The findings were called to Bernard Delaney on 6/6/2019 at 2:47 PM by Dr. Emily Camara. [id number]:  Medications   pantoprazole (PROTONIX) 40 mg in sodium chloride 0.9% 10 mL injection (has no administration in time range)   sodium chloride (NS) flush 5-40 mL (has no administration in time range)   sodium chloride (NS) flush 5-40 mL (has no administration in time range)   lactated Ringers infusion (125 mL/hr IntraVENous New Bag 6/6/19 1731)   ceFAZolin (ANCEF) 2 g/20 mL in sterile water IV syringe (has no administration in time range)   HYDROmorphone (PF) (DILAUDID) injection 1 mg (1 mg IntraVENous Given 6/6/19 1436)   ondansetron (ZOFRAN) injection 4 mg (4 mg IntraVENous Given 6/6/19 1430)   HYDROmorphone (PF) (DILAUDID) injection 1 mg (1 mg IntraVENous Given 6/6/19 1629)   sodium chloride 0.9 % bolus infusion 1,000 mL (1,000 mL IntraVENous New Bag 6/6/19 1630)         CLINICAL IMPRESSION:  1. Perforated ulcer ([address]          Plan  1. Admission per Dr Hughes       3:24 PM  The patient is being admitted to the hospital.  The results of their tests and reasons for their admission have been discussed with them and/or available family. The patient/family has conveyed agreement and understanding for the need to be admitted and for their admission diagnosis. Consultation has been made with the inpatient physician specialist for hospitalization.

## 2019-06-06 NOTE — ED NOTES
TRANSFER - OUT REPORT:    Verbal report given to Pre-Op RN (name) on Lucho Koch  being transferred to Pre-Op (unit) for ordered procedure       Report consisted of patients Situation, Background, Assessment and   Recommendations(SBAR). Information from the following report(s) SBAR, Kardex, ED Summary, Intake/Output, Recent Results and Cardiac Rhythm NSR was reviewed with the receiving nurse. Lines:   Peripheral IV 06/06/19 Right Forearm (Active)   Site Assessment Clean, dry, & intact 6/6/2019  2:32 PM   Phlebitis Assessment 0 6/6/2019  2:32 PM   Infiltration Assessment 0 6/6/2019  2:32 PM   Dressing Status Clean, dry, & intact 6/6/2019  2:32 PM   Hub Color/Line Status Blue 6/6/2019  2:32 PM   Alcohol Cap Used Yes 6/6/2019  2:32 PM       Peripheral IV 06/06/19 Left Antecubital (Active)   Site Assessment Clean, dry, & intact 6/6/2019  2:33 PM   Phlebitis Assessment 0 6/6/2019  2:33 PM   Infiltration Assessment 0 6/6/2019  2:33 PM   Dressing Status Clean, dry, & intact 6/6/2019  2:33 PM   Hub Color/Line Status Pink 6/6/2019  2:33 PM   Alcohol Cap Used Yes 6/6/2019  2:33 PM        Opportunity for questions and clarification was provided. Patient transported with:   Monitor   Belongings  Family    The estimation at this time to be tx to pre-op is 1700.

## 2019-06-07 ENCOUNTER — APPOINTMENT (OUTPATIENT)
Dept: VASCULAR SURGERY | Age: 64
DRG: 329 | End: 2019-06-07
Attending: SURGERY
Payer: COMMERCIAL

## 2019-06-07 ENCOUNTER — APPOINTMENT (OUTPATIENT)
Dept: GENERAL RADIOLOGY | Age: 64
DRG: 329 | End: 2019-06-07
Attending: SURGERY
Payer: COMMERCIAL

## 2019-06-07 LAB
ANION GAP SERPL CALC-SCNC: 8 MMOL/L (ref 5–15)
BASOPHILS # BLD: 0 K/UL (ref 0–0.1)
BASOPHILS NFR BLD: 0 % (ref 0–1)
BUN SERPL-MCNC: 10 MG/DL (ref 6–20)
BUN/CREAT SERPL: 18 (ref 12–20)
CALCIUM SERPL-MCNC: 8.3 MG/DL (ref 8.5–10.1)
CHLORIDE SERPL-SCNC: 105 MMOL/L (ref 97–108)
CO2 SERPL-SCNC: 26 MMOL/L (ref 21–32)
CREAT SERPL-MCNC: 0.56 MG/DL (ref 0.55–1.02)
DIFFERENTIAL METHOD BLD: ABNORMAL
EOSINOPHIL # BLD: 0 K/UL (ref 0–0.4)
EOSINOPHIL NFR BLD: 0 % (ref 0–7)
ERYTHROCYTE [DISTWIDTH] IN BLOOD BY AUTOMATED COUNT: 13.1 % (ref 11.5–14.5)
GLUCOSE BLD STRIP.AUTO-MCNC: 108 MG/DL (ref 65–100)
GLUCOSE SERPL-MCNC: 110 MG/DL (ref 65–100)
HCT VFR BLD AUTO: 36.5 % (ref 35–47)
HGB BLD-MCNC: 11.9 G/DL (ref 11.5–16)
IMM GRANULOCYTES # BLD AUTO: 0 K/UL
IMM GRANULOCYTES NFR BLD AUTO: 0 %
LYMPHOCYTES # BLD: 0.6 K/UL (ref 0.8–3.5)
LYMPHOCYTES NFR BLD: 7 % (ref 12–49)
MCH RBC QN AUTO: 30.1 PG (ref 26–34)
MCHC RBC AUTO-ENTMCNC: 32.6 G/DL (ref 30–36.5)
MCV RBC AUTO: 92.4 FL (ref 80–99)
MONOCYTES # BLD: 0.4 K/UL (ref 0–1)
MONOCYTES NFR BLD: 5 % (ref 5–13)
NEUTS BAND NFR BLD MANUAL: 14 % (ref 0–6)
NEUTS SEG # BLD: 7.2 K/UL (ref 1.8–8)
NEUTS SEG NFR BLD: 74 % (ref 32–75)
NRBC # BLD: 0 K/UL (ref 0–0.01)
NRBC BLD-RTO: 0 PER 100 WBC
PLATELET # BLD AUTO: 196 K/UL (ref 150–400)
PMV BLD AUTO: 9.5 FL (ref 8.9–12.9)
POTASSIUM SERPL-SCNC: 4 MMOL/L (ref 3.5–5.1)
RBC # BLD AUTO: 3.95 M/UL (ref 3.8–5.2)
RBC MORPH BLD: ABNORMAL
SERVICE CMNT-IMP: ABNORMAL
SODIUM SERPL-SCNC: 139 MMOL/L (ref 136–145)
WBC # BLD AUTO: 8.2 K/UL (ref 3.6–11)

## 2019-06-07 PROCEDURE — 76937 US GUIDE VASCULAR ACCESS: CPT

## 2019-06-07 PROCEDURE — 65270000029 HC RM PRIVATE

## 2019-06-07 PROCEDURE — 80048 BASIC METABOLIC PNL TOTAL CA: CPT

## 2019-06-07 PROCEDURE — 82962 GLUCOSE BLOOD TEST: CPT

## 2019-06-07 PROCEDURE — 93923 UPR/LXTR ART STDY 3+ LVLS: CPT

## 2019-06-07 PROCEDURE — 74011250636 HC RX REV CODE- 250/636: Performed by: SURGERY

## 2019-06-07 PROCEDURE — 74011000250 HC RX REV CODE- 250: Performed by: SURGERY

## 2019-06-07 PROCEDURE — 36415 COLL VENOUS BLD VENIPUNCTURE: CPT

## 2019-06-07 PROCEDURE — 02HV33Z INSERTION OF INFUSION DEVICE INTO SUPERIOR VENA CAVA, PERCUTANEOUS APPROACH: ICD-10-PCS | Performed by: SURGERY

## 2019-06-07 PROCEDURE — C1751 CATH, INF, PER/CENT/MIDLINE: HCPCS

## 2019-06-07 PROCEDURE — 77030018786 HC NDL GD F/USND BARD -B

## 2019-06-07 PROCEDURE — 74011000258 HC RX REV CODE- 258: Performed by: SURGERY

## 2019-06-07 PROCEDURE — 85025 COMPLETE CBC W/AUTO DIFF WBC: CPT

## 2019-06-07 PROCEDURE — C9113 INJ PANTOPRAZOLE SODIUM, VIA: HCPCS | Performed by: SURGERY

## 2019-06-07 PROCEDURE — 36573 INSJ PICC RS&I 5 YR+: CPT | Performed by: SURGERY

## 2019-06-07 RX ORDER — METOCLOPRAMIDE HYDROCHLORIDE 5 MG/ML
5 INJECTION INTRAMUSCULAR; INTRAVENOUS
Status: DISCONTINUED | OUTPATIENT
Start: 2019-06-07 | End: 2019-06-13 | Stop reason: HOSPADM

## 2019-06-07 RX ORDER — ACETAMINOPHEN 650 MG/1
650 SUPPOSITORY RECTAL
Status: DISCONTINUED | OUTPATIENT
Start: 2019-06-07 | End: 2019-06-13 | Stop reason: HOSPADM

## 2019-06-07 RX ORDER — ONDANSETRON 2 MG/ML
4 INJECTION INTRAMUSCULAR; INTRAVENOUS EVERY 4 HOURS
Status: DISCONTINUED | OUTPATIENT
Start: 2019-06-07 | End: 2019-06-08

## 2019-06-07 RX ORDER — DICLOFENAC SODIUM 10 MG/G
2 GEL TOPICAL
Status: DISCONTINUED | OUTPATIENT
Start: 2019-06-07 | End: 2019-06-13 | Stop reason: HOSPADM

## 2019-06-07 RX ORDER — PROCHLORPERAZINE EDISYLATE 5 MG/ML
5 INJECTION INTRAMUSCULAR; INTRAVENOUS
Status: DISCONTINUED | OUTPATIENT
Start: 2019-06-07 | End: 2019-06-13 | Stop reason: HOSPADM

## 2019-06-07 RX ADMIN — PIPERACILLIN SODIUM,TAZOBACTAM SODIUM 3.38 G: 3; .375 INJECTION, POWDER, FOR SOLUTION INTRAVENOUS at 06:55

## 2019-06-07 RX ADMIN — PIPERACILLIN SODIUM,TAZOBACTAM SODIUM 3.38 G: 3; .375 INJECTION, POWDER, FOR SOLUTION INTRAVENOUS at 15:53

## 2019-06-07 RX ADMIN — ONDANSETRON 4 MG: 2 INJECTION INTRAMUSCULAR; INTRAVENOUS at 02:48

## 2019-06-07 RX ADMIN — ONDANSETRON 4 MG: 2 INJECTION INTRAMUSCULAR; INTRAVENOUS at 06:55

## 2019-06-07 RX ADMIN — SODIUM CHLORIDE 40 MG: 9 INJECTION INTRAMUSCULAR; INTRAVENOUS; SUBCUTANEOUS at 22:32

## 2019-06-07 RX ADMIN — SODIUM CHLORIDE 40 MG: 9 INJECTION INTRAMUSCULAR; INTRAVENOUS; SUBCUTANEOUS at 08:51

## 2019-06-07 RX ADMIN — PROCHLORPERAZINE EDISYLATE 5 MG: 5 INJECTION INTRAMUSCULAR; INTRAVENOUS at 15:19

## 2019-06-07 RX ADMIN — ONDANSETRON 4 MG: 2 INJECTION INTRAMUSCULAR; INTRAVENOUS at 10:02

## 2019-06-07 RX ADMIN — PIPERACILLIN SODIUM,TAZOBACTAM SODIUM 3.38 G: 3; .375 INJECTION, POWDER, FOR SOLUTION INTRAVENOUS at 22:33

## 2019-06-07 RX ADMIN — ASCORBIC ACID, VITAMIN A PALMITATE, CHOLECALCIFEROL, THIAMINE HYDROCHLORIDE, RIBOFLAVIN-5 PHOSPHATE SODIUM, PYRIDOXINE HYDROCHLORIDE, NIACINAMIDE, DEXPANTHENOL, ALPHA-TOCOPHEROL ACETATE, VITAMIN K1, FOLIC ACID, BIOTIN, CYANOCOBALAMIN: 200; 3300; 200; 6; 3.6; 6; 40; 15; 10; 150; 600; 60; 5 INJECTION, SOLUTION INTRAVENOUS at 18:53

## 2019-06-07 RX ADMIN — I.V. FAT EMULSION 500 ML: 20 EMULSION INTRAVENOUS at 18:45

## 2019-06-07 RX ADMIN — Medication: at 15:03

## 2019-06-07 RX ADMIN — ONDANSETRON 4 MG: 2 INJECTION INTRAMUSCULAR; INTRAVENOUS at 22:33

## 2019-06-07 NOTE — PROGRESS NOTES
Nutrition Assessment:    RECOMMENDATIONS/INTERVENTION(S):   1. TPN recommendations:   Day 1: TPN (D20/AA6) @ 21 mL/hr  Day 2: TPN (D20/AA6) @ 42 mL/hr  Lipids - 20% @ 42 mL/hr x 12 hrs, 3 times per week    Goal provides 1358 kcal and 50 gm protein - meeting 88% kcal needs and 91% lower end of protein needs     2. Add 1 mg Folate and 100 mg Thiamine to TPN to avoid Refeeding    3. Monitor GI function and advance to diet as medically able. 4. Monitor labs and weight trends. ASSESSMENT:   6/7: 74 y/o F admitted with perforated gastric ulcer. MD consult for perforated gastric ulcer and low BMI. PMH - gastric ulcer, hyperlipidemia, HTN. S/p laparoscopy perforated gastric ulcer repair 6/6. Per MD notes, pt with 40 lb weight loss x 2 mo, indicating severe weight loss 28% x 2 mo. No weight Hx per EMR.  lb. BMI 17.9 c/w underweight. Suspect limited PO intake r/t poor appetite, concern for refeeding. Estimated energy needs +250kcal to promote healthy weight. LBM 6/5. No note of n/v/c/d. Labs: Ca 8.3 L. Meds: ondansetron, pantoprazole, piperacillin-tazobactam.       Diet Order:  NPO  % Eaten:  No data found. Pertinent Medications: [x] Reviewed    Labs: [x] Reviewed    Anthropometrics: Height: 5' 3\" (160 cm) Weight: 45.8 kg (101 lb)    IBW (%IBW):   ( ) UBW (%UBW):   (  %)      BMI: Body mass index is 17.89 kg/m². This BMI is indicative of:   [x] Underweight    [] Normal    [] Overweight    []  Obesity    []  Extreme Obesity (BMI>40)  Estimated Nutrition Needs (Based on): 4923 Kcals/day(REE 1078 x 1.2 + 250kcal ) , 60 g(55-64 (1.2 - 1.4 gm/kg)) Protein  Carbohydrate: At Least 130 g/day  Fluids: 1544 mL/day (1 ml/kcal)    Last BM: 6/5   []Active     []Hyperactive  []Hypoactive       [] Absent   BS  Skin:    [] Intact   [x] Incision  [] Breakdown   [] DTI   [] Tears/Excoriation/Abrasion  []Edema [] Other:    Wt Readings from Last 30 Encounters:   06/06/19 45.8 kg (101 lb)      NUTRITION DIAGNOSES: Problem:  Altered GI function     Etiology: related to alteration in GI sturucture/ function     Signs/Symptoms: as evidenced by perforated gastric ulcer      NUTRITION INTERVENTIONS:    Enteral/Parenteral Nutrition: Initiate parenteral nutrition                GOAL:   Pt will tolerate TPN at goal without electrolyte disturbances in 3-5 days    Cultural, Hoahaoism, or Ethnic Dietary Needs:   None     EDUCATION & DISCHARGE NEEDS:    [x] None Identified   [] Identified and Education Provided/Documented   [] Identified and Pt declined/was not appropriate      [x] Interdisciplinary Care Plan Reviewed/Documented    [x] Discharge Needs:    TBD - currently NPO, TPN initiation   [] No Nutrition Related Discharge Needs    NUTRITION RISK:   Pt Is At Nutrition Risk  [x]     No Nutrition Risk Identified  []       PT SEEN FOR:    [x]  MD Consult: []Calorie Count      []Diabetic Diet Education        []Diet Education     []Electrolyte Management     []General Nutrition Management and Supplements     []Management of Tube Feeding     [x]TPN Recommendations    []  RN Referral:  []MST score >=2     []Enteral/Parenteral Nutrition PTA     []Pregnant: Gestational DM or Multigestation                 [] Pressure Ulcer    [x]  Low BMI      []  Length of Stay       [] Dysphagia Diet         [] Ventilator  []  Follow-up     Previous Recommendations:   [] Implemented          [] Not Implemented          [x] Not Applicable    Previous Goal:   [] Met              [] Progressing Towards Goal              [] Not Progressing Towards Goal   [x] Not Applicable            Charo Johns, [address] [facility]  Pager [phone]  Phone [phone]

## 2019-06-07 NOTE — ANESTHESIA POSTPROCEDURE EVALUATION
Procedure(s):  LAPAROSCOPY PERFORATED GASTRIC ULCER REPAIR. general    Anesthesia Post Evaluation      Multimodal analgesia: multimodal analgesia used between 6 hours prior to anesthesia start to PACU discharge  Patient location during evaluation: bedside  Patient participation: complete - patient participated  Level of consciousness: awake  Pain management: adequate  Airway patency: patent  Anesthetic complications: no  Cardiovascular status: acceptable  Respiratory status: acceptable  Hydration status: acceptable  Post anesthesia nausea and vomiting:  controlled      Vitals Value Taken Time   /63 6/6/2019  9:25 PM   Temp 36.1 °C (97 °F) 6/6/2019  8:49 PM   Pulse 79 6/6/2019  9:26 PM   Resp 16 6/6/2019  9:26 PM   SpO2 100 % 6/6/2019  9:26 PM   Vitals shown include unvalidated device data.

## 2019-06-07 NOTE — PROGRESS NOTES
Clinically stable, doing well. Abdominal pain controlled, complaining of knee pain. Visit Vitals  /67 (BP 1 Location: Right arm, BP Patient Position: At rest)   Pulse 89   Temp 98.6 °F (37 °C)   Resp 14   Ht 5' 3\" (1.6 m)   Wt 45.8 kg (101 lb)   SpO2 97%   BMI 17.89 kg/m²     Abdomen soft  Drains serosanguinous    Lab Results   Component Value Date/Time    WBC 8.2 06/07/2019 03:32 AM    HGB 11.9 06/07/2019 03:32 AM    HCT 36.5 06/07/2019 03:32 AM    PLATELET 850 57/72/2053 03:32 AM    MCV 92.4 06/07/2019 03:32 AM     BMP:   Lab Results   Component Value Date/Time     06/07/2019 03:32 AM    K 4.0 06/07/2019 03:32 AM     06/07/2019 03:32 AM    CO2 26 06/07/2019 03:32 AM    AGAP 8 06/07/2019 03:32 AM     (H) 06/07/2019 03:32 AM    BUN 10 06/07/2019 03:32 AM    CREA 0.56 06/07/2019 03:32 AM    GFRAA >60 06/07/2019 03:32 AM    GFRNA >60 06/07/2019 03:32 AM        NPO/IVF  Severe malnutrition, lost 40 lbs in two months. Consult for dietary/PICC team. Start TPN today. Pathology results pending  Ordered b/l lower extremity doppler.   PPI BID  IV antibiotics    Abdiaziz Potter MD

## 2019-06-07 NOTE — PROGRESS NOTES
Physical Therapy:  Orders received and chart reviewed. Patient currently off the floor for test.  We will continue to follow and re-attempt later as able. Thank you.   Lisset Stevenson PT,DPT,NCS

## 2019-06-07 NOTE — PROGRESS NOTES
PICC Placement Note    PRE-PROCEDURE VERIFICATION  Correct Procedure: yes  Correct Site:  yes  Temperature: Temp: 98.1 °F (36.7 °C), Temperature Source: Temp Source: Oral  Recent Labs     06/07/19  0332   BUN 10   CREA 0.56      WBC 8.2     Allergies: Patient has no known allergies. Education materials for PICC Care given: yes. See Patient Education activity for further details. PICC Booklet placed at bedside: yes    Closed Ended PICC Catheters:  Flush Lumens as Follows:  Intermittent Medication:   Flush before and after each medication with 10 ml NS. Unused Ports:  Flush every 8 hours with 10 ml NS.  TPN Ports:  Flush every 24 hours with 20 ml NS prior to hanging new bag. Blood Draws: Stop infusion, draw off and waste 10 ml of blood. Draw sample with 10cc syringe or greater. DO NOT USE VACUTAINER . Transfer with appropriate device to lab  tubes. Flush with 20 ml NS. Dressing Change:  Every 7 days, and PRN using sterile technique if integrity of dressing is compromised. Initial dressing change for central line 24-48 hours post insertion if gauze is used. Apply new dressing per policy. PROCEDURE DETAIL  Consent was obtained and all questions were answered related to risks and benefits. A triple lumen PICC line was inserted, as a sterile procedure using ultrasound and modified Seldinger technique for vascular access and TPN. The following documentation is in addition to the PICC properties in the lines/airways flowsheet :  Lot #: [id number]  Lidocaine 1% administered intradermally :yes  Internal Catheter Total Length: 36cm with 1cm out (cm)  Vein Selection for PICC:right basilic  Central Line Bundle followed yes  Complication Related to Insertion:no    The placement was verified by EKG, MAX P WAVE @ 36cm with 1 out (cm). PER EKG PICC TIP @ C/A junction.        Line is okay to use: yes    Anh Avendano RN

## 2019-06-07 NOTE — BRIEF OP NOTE
BRIEF OPERATIVE NOTE    Date of Procedure: 6/6/2019   Preoperative Diagnosis: GASTRIC ULCER PERFORATION  Postoperative Diagnosis: GASTRIC ULCER PERFORATION    Procedure(s):  LAPAROSCOPY PERFORATED GASTRIC ULCER REPAIR  Surgeon(s) and Role:     Iván Goff MD - Primary    Surgical Staff:  Circ-1: Kaela Singh RN  Scrub Tech-1: Ranulfo Davison  Surg Asst-1: Duane Pott D  Event Time In Time Out   Incision Start 1929    Incision Close 2026      Anesthesia: General   Estimated Blood Loss: Min  Specimens:   ID Type Source Tests Collected by Time Destination   1 : giant gastric ulcer Preservative Ulcer  Edelmira James MD 6/6/2019 1946 Pathology      Findings: 3cm ulcer, greater curvature stomach   Complications: none  Implants: * No implants in log *

## 2019-06-07 NOTE — PROGRESS NOTES
Spiritual Care Assessment/Progress Note  [address]      NAME: Margarito Calhoun      MRN: [id number]  AGE: 75 y.o. SEX: female  Denominational Affiliation: Synagogue   Language: English     6/7/2019     Total Time (in minutes): 5     Spiritual Assessment begun in [facility] POST SURG ORT 1 through conversation with:         []Patient        [] Family    [] Friend(s)        Reason for Consult: Initial/Spiritual assessment, patient floor     Spiritual beliefs: (Please include comment if needed)     [] Identifies with a isrrael tradition:         [] Supported by a isrrael community:            [] Claims no spiritual orientation:           [] Seeking spiritual identity:                [] Adheres to an individual form of spirituality:           [x] Not able to assess:                           Identified resources for coping:      [] Prayer                               [] Music                  [] Guided Imagery     [] Family/friends                 [] Pet visits     [] Devotional reading                         [] Unknown     [] Other:                                         Interventions offered during this visit: (See comments for more details)    Patient Interventions: Initial visit(Attempted)           Plan of Care:     [] Support spiritual and/or cultural needs    [] Support AMD and/or advance care planning process      [] Support grieving process   [] Coordinate Rites and/or Rituals    [] Coordination with community clergy   [] No spiritual needs identified at this time   [] Detailed Plan of Care below (See Comments)  [] Make referral to Music Therapy  [] Make referral to Pet Therapy     [] Make referral to Addiction services  [] Make referral to [facility]  [] Make referral to Spiritual Care Partner  [] No future visits requested        [x] Follow up visits as needed     Comments: Attempted Initial spiritual assessment in 4 Post Surg Ortho. Mr. Shilo Augustin was not in his room at this time.   Chaplains will continue to follow as able and/or needed.   Visited by: Lorena Crouch., MS., [address] [employer] [address] ([id number])

## 2019-06-07 NOTE — OP NOTES
OPERATIVE NOTE    Date of Procedure: 6/6/2019   Preoperative Diagnosis: GASTRIC ULCER PERFORATION  Postoperative Diagnosis: GASTRIC ULCER PERFORATION    Procedure(s):  DIAGNOSTIC LAPAROSCOPY WITH WASHOUT  LAPAROSCOPY GASTRIC ULCER REPAIR WITH ELOY PATCH  Surgeon(s) and Role:     * Jeevan Pinedo MD - Primary    Surgical Staff:  Circ-1: Carlitos Sanderson, KAI  Scrub Tech-1: Hitesh Fernandez Kitcarroll  Surg Asst-1: Darren PATEL  Event Time In Time Out   Incision Start 1929    Incision Close 2026      Anesthesia: General   Estimated Blood Loss: Min  Specimens:   ID Type Source Tests Collected by Time Destination   1 : giant gastric ulcer Preservative Ulcer  Jeevan Pinedo MD 6/6/2019 1946 Pathology      Findings: 3cm ulcer, greater curvature stomach   Complications: none  Implants: * No implants in log *    INDICATION:  Pneumoperitoneum, ulcer visualized on endoscopy. PROCEDURE:  Consent was obtained. The patient was taken to the operating room and placed in supine position. SCDs were on and working. Preincision antibiotics were therapeutic, given 30 minutes prior to skin incision. A Stahl catheter was placed. A preincision timeout was performed per protocol. Abdomen was entered under direct endoscopic vision with a 5 mm clear tissue dissection port, left upper quadrant at Obando's point. Insufflation was established and maintained at 15 mmHg. Reintroduction of the camera revealed no injury to the colon or surrounding organs. Following ports were placed under direct endoscopic vision: #1, a 5 mm port in the midclavicular line 5 mm medial to the Obando's point incision; #2, a 5 mm midline port 5 cm away from the previously mentioned port, through the caudal base of the falciform ligament; #3, a 5 mm subxiphoid port at the more cephalad falciform ligament, through to the right of the falciform. The patient was placed in reverse Trendelenburg position.   NG tube was placed under endoscopic vision and, noted to be 65 cm at the nose. A moderate amount of prulent bile was washed out and suction evacuated from the peritoneum. There was abida pus and devitalized omentum near the greater curvature that was suction evacuated and cultured, and subsequently washed out. Biopsies were taken for pathological analysis of the ulcer. 2-0 V-loc was used to close the gastric ulcer with full thickness bites. An appropriate tongue of omentum was taken with its blood supply and laid across the repair with care. 0 silk suture were then used to secure omentum and abut the perforation. This closure was tested with air insufflation from the NG tube, found to be intact. All 4 quadrants were subsequently irrigated with 2 liters of warm saline. Hemostasis was satisfactory. BRIAN drains were placed in the following fashion:  Drains were placed through the left lateral incisions to drain the area just lateral and medial to the [facility] AT [facility]. Ports were removed under direct endoscopic vision. Exparel expanded with 0.5% Marcaine plain was injected into the surgical sites. Drains were secured with 2-0 nylon. Skin of the two remaining port sites were closed with interrupted 4-0 Monocryl stitches. Clean dressings were placed and the patient was moved to PACU in stable condition. Stahl catheter will remain for strict ins and outs, and the NG tube will remain to drain the stomach while she is healing. Patient tolerated the procedure well and was moved to the PACU in stable condition. I will discuss the findings of the case with her family in the waiting area.       Salinas Silva MD

## 2019-06-07 NOTE — PERIOP NOTES
TRANSFER - OUT REPORT:    Verbal report given to favio oh(name) on Patrice Loges  being transferred to [facility](unit) for routine post - op       Report consisted of patients Situation, Background, Assessment and   Recommendations(SBAR). Information from the following report(s) SBAR, Procedure Summary, Intake/Output and MAR was reviewed with the receiving nurse. Lines:   Peripheral IV 06/06/19 Right Forearm (Active)   Site Assessment Clean, dry, & intact 6/6/2019  8:55 PM   Phlebitis Assessment 0 6/6/2019  8:55 PM   Infiltration Assessment 0 6/6/2019  8:55 PM   Dressing Status Clean, dry, & intact 6/6/2019  8:55 PM   Dressing Type Tape;Transparent 6/6/2019  8:55 PM   Hub Color/Line Status Blue 6/6/2019  8:55 PM   Alcohol Cap Used Yes 6/6/2019  2:32 PM       Peripheral IV 06/06/19 Left Antecubital (Active)   Site Assessment Clean, dry, & intact 6/6/2019  8:55 PM   Phlebitis Assessment 0 6/6/2019  8:55 PM   Infiltration Assessment 0 6/6/2019  8:55 PM   Dressing Status Clean, dry, & intact 6/6/2019  8:55 PM   Dressing Type Tape;Transparent 6/6/2019  8:55 PM   Hub Color/Line Status Pink 6/6/2019  8:55 PM   Alcohol Cap Used Yes 6/6/2019  5:26 PM        Opportunity for questions and clarification was provided.       Patient transported with:   O2 @ 2 liters  Registered Nurse

## 2019-06-07 NOTE — PROGRESS NOTES
Patient slept deeply during the night. Patient denied pain although she moans in her sleep. Drainage was listed in the chart and no s/s of nausea. Patient is alert and oriented. Spouse was called per patient's request thie morning.

## 2019-06-08 LAB
ANION GAP SERPL CALC-SCNC: 5 MMOL/L (ref 5–15)
BASOPHILS # BLD: 0 K/UL (ref 0–0.1)
BASOPHILS NFR BLD: 0 % (ref 0–1)
BUN SERPL-MCNC: 11 MG/DL (ref 6–20)
BUN/CREAT SERPL: 22 (ref 12–20)
CALCIUM SERPL-MCNC: 8.5 MG/DL (ref 8.5–10.1)
CHLORIDE SERPL-SCNC: 108 MMOL/L (ref 97–108)
CO2 SERPL-SCNC: 28 MMOL/L (ref 21–32)
CREAT SERPL-MCNC: 0.51 MG/DL (ref 0.55–1.02)
DIFFERENTIAL METHOD BLD: ABNORMAL
EOSINOPHIL # BLD: 0.1 K/UL (ref 0–0.4)
EOSINOPHIL NFR BLD: 1 % (ref 0–7)
ERYTHROCYTE [DISTWIDTH] IN BLOOD BY AUTOMATED COUNT: 13.4 % (ref 11.5–14.5)
GLUCOSE BLD STRIP.AUTO-MCNC: 107 MG/DL (ref 65–100)
GLUCOSE BLD STRIP.AUTO-MCNC: 116 MG/DL (ref 65–100)
GLUCOSE BLD STRIP.AUTO-MCNC: 118 MG/DL (ref 65–100)
GLUCOSE BLD STRIP.AUTO-MCNC: 120 MG/DL (ref 65–100)
GLUCOSE SERPL-MCNC: 129 MG/DL (ref 65–100)
HCT VFR BLD AUTO: 31.1 % (ref 35–47)
HGB BLD-MCNC: 10.1 G/DL (ref 11.5–16)
IMM GRANULOCYTES # BLD AUTO: 0 K/UL (ref 0–0.04)
IMM GRANULOCYTES NFR BLD AUTO: 0 % (ref 0–0.5)
LYMPHOCYTES # BLD: 0.9 K/UL (ref 0.8–3.5)
LYMPHOCYTES NFR BLD: 11 % (ref 12–49)
MAGNESIUM SERPL-MCNC: 2.3 MG/DL (ref 1.6–2.4)
MCH RBC QN AUTO: 30.6 PG (ref 26–34)
MCHC RBC AUTO-ENTMCNC: 32.5 G/DL (ref 30–36.5)
MCV RBC AUTO: 94.2 FL (ref 80–99)
MONOCYTES # BLD: 0.3 K/UL (ref 0–1)
MONOCYTES NFR BLD: 3 % (ref 5–13)
NEUTS SEG # BLD: 6.4 K/UL (ref 1.8–8)
NEUTS SEG NFR BLD: 85 % (ref 32–75)
NRBC # BLD: 0 K/UL (ref 0–0.01)
NRBC BLD-RTO: 0 PER 100 WBC
PHOSPHATE SERPL-MCNC: 1.7 MG/DL (ref 2.6–4.7)
PLATELET # BLD AUTO: 143 K/UL (ref 150–400)
PMV BLD AUTO: 9.4 FL (ref 8.9–12.9)
POTASSIUM SERPL-SCNC: 3.6 MMOL/L (ref 3.5–5.1)
RBC # BLD AUTO: 3.3 M/UL (ref 3.8–5.2)
SERVICE CMNT-IMP: ABNORMAL
SODIUM SERPL-SCNC: 141 MMOL/L (ref 136–145)
WBC # BLD AUTO: 7.6 K/UL (ref 3.6–11)

## 2019-06-08 PROCEDURE — 82962 GLUCOSE BLOOD TEST: CPT

## 2019-06-08 PROCEDURE — 97161 PT EVAL LOW COMPLEX 20 MIN: CPT

## 2019-06-08 PROCEDURE — 74011000250 HC RX REV CODE- 250: Performed by: SURGERY

## 2019-06-08 PROCEDURE — 83735 ASSAY OF MAGNESIUM: CPT

## 2019-06-08 PROCEDURE — C9113 INJ PANTOPRAZOLE SODIUM, VIA: HCPCS | Performed by: SURGERY

## 2019-06-08 PROCEDURE — 74011250636 HC RX REV CODE- 250/636: Performed by: SURGERY

## 2019-06-08 PROCEDURE — 65270000029 HC RM PRIVATE

## 2019-06-08 PROCEDURE — 36415 COLL VENOUS BLD VENIPUNCTURE: CPT

## 2019-06-08 PROCEDURE — 97530 THERAPEUTIC ACTIVITIES: CPT

## 2019-06-08 PROCEDURE — 80048 BASIC METABOLIC PNL TOTAL CA: CPT

## 2019-06-08 PROCEDURE — 84100 ASSAY OF PHOSPHORUS: CPT

## 2019-06-08 PROCEDURE — 97535 SELF CARE MNGMENT TRAINING: CPT

## 2019-06-08 PROCEDURE — 97165 OT EVAL LOW COMPLEX 30 MIN: CPT

## 2019-06-08 PROCEDURE — 74011000258 HC RX REV CODE- 258: Performed by: SURGERY

## 2019-06-08 PROCEDURE — 85025 COMPLETE CBC W/AUTO DIFF WBC: CPT

## 2019-06-08 RX ORDER — ONDANSETRON 2 MG/ML
4 INJECTION INTRAMUSCULAR; INTRAVENOUS
Status: DISCONTINUED | OUTPATIENT
Start: 2019-06-08 | End: 2019-06-13 | Stop reason: HOSPADM

## 2019-06-08 RX ADMIN — PIPERACILLIN SODIUM,TAZOBACTAM SODIUM 3.38 G: 3; .375 INJECTION, POWDER, FOR SOLUTION INTRAVENOUS at 06:25

## 2019-06-08 RX ADMIN — PROCHLORPERAZINE EDISYLATE 5 MG: 5 INJECTION INTRAMUSCULAR; INTRAVENOUS at 15:10

## 2019-06-08 RX ADMIN — ONDANSETRON 4 MG: 2 INJECTION INTRAMUSCULAR; INTRAVENOUS at 10:24

## 2019-06-08 RX ADMIN — SODIUM CHLORIDE 40 MG: 9 INJECTION INTRAMUSCULAR; INTRAVENOUS; SUBCUTANEOUS at 08:24

## 2019-06-08 RX ADMIN — CALCIUM GLUCONATE: 94 INJECTION, SOLUTION INTRAVENOUS at 17:32

## 2019-06-08 RX ADMIN — PIPERACILLIN SODIUM,TAZOBACTAM SODIUM 3.38 G: 3; .375 INJECTION, POWDER, FOR SOLUTION INTRAVENOUS at 14:49

## 2019-06-08 RX ADMIN — ONDANSETRON 4 MG: 2 INJECTION INTRAMUSCULAR; INTRAVENOUS at 02:38

## 2019-06-08 RX ADMIN — ONDANSETRON 4 MG: 2 INJECTION INTRAMUSCULAR; INTRAVENOUS at 06:25

## 2019-06-08 RX ADMIN — PIPERACILLIN SODIUM,TAZOBACTAM SODIUM 3.38 G: 3; .375 INJECTION, POWDER, FOR SOLUTION INTRAVENOUS at 22:35

## 2019-06-08 RX ADMIN — SODIUM CHLORIDE, SODIUM LACTATE, POTASSIUM CHLORIDE, AND CALCIUM CHLORIDE 125 ML/HR: 600; 310; 30; 20 INJECTION, SOLUTION INTRAVENOUS at 22:38

## 2019-06-08 RX ADMIN — SODIUM CHLORIDE 40 MG: 9 INJECTION INTRAMUSCULAR; INTRAVENOUS; SUBCUTANEOUS at 22:11

## 2019-06-08 RX ADMIN — SODIUM CHLORIDE: 900 INJECTION, SOLUTION INTRAVENOUS at 14:34

## 2019-06-08 RX ADMIN — SODIUM CHLORIDE, SODIUM LACTATE, POTASSIUM CHLORIDE, AND CALCIUM CHLORIDE 125 ML/HR: 600; 310; 30; 20 INJECTION, SOLUTION INTRAVENOUS at 10:15

## 2019-06-08 NOTE — PROGRESS NOTES
2:23 PM  CM met with pt for initial evaluation. Pt resides with spouse but plans to go to her mothers home following assistance where she will have more help because her  works. Reason for Admission:   Lap perforated gastric ulcer                   RRAT Score:   11                  Plan for utilizing home health:   TBD                    Current Advanced Directive/Advance Care Plan: Not on file    Likelihood of Readmission:  Low/Green                         Transition of Care Plan:   Pt plans to go to her mother's for recovery. CM will follow for dc needs. DME:  None  RX:  [facility] [place]  Support:  Family      PCP:  Rufino Noonan 96 Management Interventions  PCP Verified by CM: Yes(Last saw 5/19)  Mode of Transport at Discharge:  Other (see comment)(Family will provide transportation)  Discharge Durable Medical Equipment: No  Physical Therapy Consult: Yes  Occupational Therapy Consult: Yes  Speech Therapy Consult: No  Current Support Network: Lives with Spouse(Plans to go to her mother's house following dc)  Confirm Follow Up Transport: Family  Discharge Location  Discharge Placement: Home with family assistance

## 2019-06-08 NOTE — PROGRESS NOTES
General Surgery Daily Progress Note    Patient: Susannah Lima MRN: [id number]  SSN: [id number]    YOB: 1955  Age: 75 y.o. Sex: female      Admit Date: 6/6/2019    Subjective:   Reports pain controlled  Reports some nausea, which is what bothers her the most  On compazine. She reported zofran did not work    Current Facility-Administered Medications   Medication Dose Route Frequency    ondansetron (ZOFRAN) injection 4 mg  4 mg IntraVENous Q4H    acetaminophen (TYLENOL) suppository 650 mg  650 mg Rectal Q4H PRN    diclofenac (VOLTAREN) 1 % topical gel 2 g  2 g Topical QID PRN    alteplase (CATHFLO) 1 mg in sterile water (preservative free) 1 mL injection  1 mg InterCATHeter PRN    prochlorperazine (COMPAZINE) injection 5 mg  5 mg IntraVENous Q6H PRN    metoclopramide HCl (REGLAN) injection 5 mg  5 mg IntraVENous Q6H PRN    fat emulsion 20% (LIPOSYN, INTRAlipid) infusion 500 mL  500 mL IntraVENous Q MON, WED & FRI    TPN ADULT - CENTRAL AA 5% D20% W/ ELECTROLYTES AND CA   IntraVENous CONTINUOUS    pantoprazole (PROTONIX) 40 mg in sodium chloride 0.9% 10 mL injection  40 mg IntraVENous Q12H    lactated Ringers infusion  125 mL/hr IntraVENous CONTINUOUS    HYDROmorphone (PF) 25 mg/50 mL (DILAUDID) PCA   IntraVENous TITRATE    piperacillin-tazobactam (ZOSYN) 3.375 g in 0.9% sodium chloride (MBP/ADV) 100 mL  3.375 g IntraVENous Q8H        Objective:   No intake/output data recorded. 06/06 1901 - 06/08 0700  In: 2550 [I.V.:2550]  Out: 1605 [Urine:945; Drains:660]  Patient Vitals for the past 8 hrs:   BP Temp Pulse Resp SpO2 Weight   06/08/19 1111 130/68 98.6 °F (37 °C) 71 16 97 %    06/08/19 0736 130/68 98.4 °F (36.9 °C) 76 16 97 %    06/08/19 0709      47.6 kg (105 lb)       Physical Exam:  General: Alert, cooperative, NAD  Abdomen: Soft, appropriately TTP, BRIAN x 2 with serous fluid.   Incisions C/D/I  Extremities: Warm, moves all, no edema  Skin:  Warm and dry, no rash    Labs:   Recent Labs     06/08/19 0245   WBC 7.6   HGB 10.1*   HCT 31.1*   *     Recent Labs     06/08/19  0245  06/06/19  1407      < > 135*   K 3.6   < > 4.4      < > 100   CO2 28   < > 27   *   < > 160*   BUN 11   < > 8   CREA 0.51*   < > 0.67   CA 8.5   < > 9.6   MG 2.3  --   --    PHOS 1.7*  --   --    ALB  --   --  3.1*   TBILI  --   --  0.7   SGOT  --   --  17   ALT  --   --  14    < > = values in this interval not displayed.        Assessment / Plan:   · Continue TPN  · Continue abx  · Continue PPI

## 2019-06-08 NOTE — PROGRESS NOTES
Problem: Self Care Deficits Care Plan (Adult)  Goal: *Acute Goals and Plan of Care (Insert Text)  Description  Occupational Therapy Goals  Initiated 6/8/2019    1. Patient will utilize energy conservation techniques during functional activities with verbal cues within 7 day(s). 2.  Patient will perform upper body dressing and bathing with independence within 7 day(s). 3.  Patient will perform lower body dressing, using AE prn, with modified independence within 7 day(s). 4.  Patient will perform toilet transfers with modified independence within 7 day(s). 5.  Patient will perform all aspects of toileting with modified independence within 7 day(s). 6.  Patient will participate in upper extremity therapeutic exercise/activities with modified independence for 10-15 minutes within 7 day(s). Note:   OCCUPATIONAL THERAPY EVALUATION  Patient: Jose L Parra (89 y.o. female)  Date: 6/8/2019  Primary Diagnosis: Perforated gastric ulcer ([facility] [address] [K25.5]  Procedure(s) (LRB):  LAPAROSCOPY PERFORATED GASTRIC ULCER REPAIR (N/A) 2 Days Post-Op   Precautions:        ASSESSMENT :    Based on the objective data described below, the patient presents with decreased independence in ADLs and functional mobility. Patient has lost 40 lbs in the past few months and presents 2 days post perforated gastric ulcer repair. She states that PTA she was independent in ADLS and working as a patient care technician. She currently requires Min A for functional transfers, Mod A LB ADLS, Mod A toileting, and setup for UB ADLS and grooming secondary to decreased functional reach towards feet (pain with trunk flexion), decreased endurance, strength, and balance. She will benefit from skilled OT to improve independence and ADLs before discharging from facility. Patient plans to discharge to her mother's home (she is 80years old) and will likely benefit from home health OT.     Next session: trial LB dressing AE to assist in ADL independence     Patient will benefit from skilled intervention to address the above impairments. Patient?s rehabilitation potential is considered to be Excellent  Factors which may influence rehabilitation potential include:   ? None noted  ? Mental ability/status  ? Medical condition  ? Home/family situation and support systems  ? Safety awareness  ? Pain tolerance/management  ? Other:      PLAN :  Recommendations and Planned Interventions:  ?               Self Care Training                  ? Therapeutic Activities  ? Functional Mobility Training    ? Cognitive Retraining  ? Therapeutic Exercises           ? Endurance Activities  ? Balance Training                   ? Neuromuscular Re-Education  ? Visual/Perceptual Training     ? Home Safety Training  ? Patient Education                 ? Family Training/Education  ? Other (comment):    Frequency/Duration: Patient will be followed by occupational therapy 5 times a week to address goals. Discharge Recommendations: Home Health  Further Equipment Recommendations for Discharge: TBD, possibly LB dressing AE     SUBJECTIVE:   Patient stated ? I work in patient care, bathing them and stuff. ?    OBJECTIVE DATA SUMMARY:   HISTORY:   Past Medical History:   Diagnosis Date    Dental bridge present     Gastric ulcer 06/06/2019    Generalized OA     History of vascular access device 06/07/2019    [facility] VAT 5 FR triple PICC 82WL R Basilic for TPN    Hyperlipidemia     Hypertension      Past Surgical History:   Procedure Laterality Date    HX ENDOSCOPY         Prior Level of Function/Environment/Context: Patient states that she will going to stay with her 80year old mother at discharge. PTA she was working in [employer]" which was a very physical job for her.  She was independent in ADLs and mobility. Expanded or extensive additional review of patient history:     Home Situation  Home Environment: Private residence(plans to stay with mother at mother's house)  # Steps to Enter: 1  Rails to Enter: No  Wheelchair Ramp: No  One/Two Story Residence: One story  Living Alone: No  Support Systems: Family member(s), Spouse/Significant Other/Partner  Patient Expects to be Discharged to[de-identified] Private residence(Mother's home)  Current DME Used/Available at Home: None  Tub or Shower Type: Tub/Shower combination(reports mother has both)    Hand dominance: Left    EXAMINATION OF PERFORMANCE DEFICITS:  Cognitive/Behavioral Status:  Neurologic State: Drowsy; Lethargic  Orientation Level: Oriented X4  Cognition: Appropriate for age attention/concentration  Perception: Appears intact  Perseveration: No perseveration noted  Safety/Judgement: Awareness of environment    Skin: intact    Edema: none in the uppers     Hearing: Auditory  Auditory Impairment: None    Vision/Perceptual:                 No recent changes reported. Corrective Lenses: Reading glasses    Range of Motion:    AROM: Generally decreased, functional  PROM: Within functional limits             Strength:    Strength: Generally decreased, functional          Coordination:  Coordination: Generally decreased, functional  Fine Motor Skills-Upper: Left Intact; Right Intact    Gross Motor Skills-Upper: Right Intact; Left Intact    Tone & Sensation:    Tone: Normal  Sensation: Intact except :tingling in fingertips of R hand. Patient states this is not new and her MD is aware and \"gave her some medicine for it. \"             Balance:  Sitting: Intact; Without support  Standing: Intact; Without support    Functional Mobility and Transfers for ADLs:  Bed Mobility:  Rolling: Minimum assistance  Supine to Sit: Minimum assistance  Scooting: Stand-by assistance    Transfers:  Sit to Stand: Contact guard assistance  Stand to Sit: Contact guard assistance  Bed to Chair: Contact guard assistance  Toilet Transfer : Contact guard assistance  Tub Transfer: Total assistance(unable to step over ledge of tub)    ADL Assessment:  Feeding: Independent    Oral Facial Hygiene/Grooming: Setup    Bathing: Moderate assistance(A with b/l LE's)    Upper Body Dressing: Setup    Lower Body Dressing: Moderate assistance(A with donning clothes over feet)    Toileting: Moderate assistance        Cognitive Retraining  Safety/Judgement: Awareness of environment      Functional Measure:  Barthel Index:    Bathin  Bladder: 0  Bowels: 5  Groomin  Dressin  Feeding: 10  Mobility: 10  Stairs: 0  Toilet Use: 5  Transfer (Bed to Chair and Back): 10  Total: 50/100        Percentage of impairment   0%   1-19%   20-39%   40-59%   60-79%   80-99%   100%   Barthel Score 0-100 100 99-80 79-60 59-40 20-39 1-19   0     The Barthel ADL Index: Guidelines  1. The index should be used as a record of what a patient does, not as a record of what a patient could do. 2. The main aim is to establish degree of independence from any help, physical or verbal, however minor and for whatever reason. 3. The need for supervision renders the patient not independent. 4. A patient's performance should be established using the best available evidence. Asking the patient, friends/relatives and nurses are the usual sources, but direct observation and common sense are also important. However direct testing is not needed. 5. Usually the patient's performance over the preceding 24-48 hours is important, but occasionally longer periods will be relevant. 6. Middle categories imply that the patient supplies over 50 per cent of the effort. 7. Use of aids to be independent is allowed. Kimberlee Weir., Barthel, D.W. (3363). Functional evaluation: the Barthel Index. [facility] (14)2. Elizabeth Moreno dominique BENJAMIN Uribe, Devora Rea., Claire Sullivan, [place], [address]  Measuring the change indisability after inpatient rehabilitation; comparison of the responsiveness of the Barthel Index and Functional [place] Measure. Journal of Neurology, Neurosurgery, and Psychiatry, 66(4), 312-663. MILA Arriaga, MARILIN Feliciano, & Italo Lund M.A. (2004.) Assessment of post-stroke quality of life in cost-effectiveness studies: The usefulness of the Barthel Index and the EuroQoL-5D. Quality of Life Research, 15, 149-01        Occupational Therapy Evaluation Charge Determination   History Examination Decision-Making   LOW Complexity : Brief history review  LOW Complexity : 1-3 performance deficits relating to physical, cognitive , or psychosocial skils that result in activity limitations and / or participation restrictions  LOW Complexity : No comorbidities that affect functional and no verbal or physical assistance needed to complete eval tasks       Based on the above components, the patient evaluation is determined to be of the following complexity level: LOW   Pain:  Pain Scale 1: Numeric (0 - 10)  Pain Intensity 1: 0  Pain Location 1: Abdomen        Pain Intervention(s) 1: Encouraged PCA  Activity Tolerance:     Poor     Please refer to the flowsheet for vital signs taken during this treatment. After treatment:   ? Patient left in no apparent distress sitting up in chair  ? Patient left in no apparent distress in bed  ? Call bell left within reach  ? Nursing notified  ? Caregiver present  ? Bed alarm activated    COMMUNICATION/EDUCATION:   The patient?s plan of care was discussed with: Physical Therapist and Registered Nurse.  ? Home safety education was provided and the patient/caregiver indicated understanding. ? Patient/family have participated as able in goal setting and plan of care. ? Patient/family agree to work toward stated goals and plan of care. ? Patient understands intent and goals of therapy, but is neutral about his/her participation. ?  Patient is unable to participate in goal setting and plan of care. This patient?s plan of care is appropriate for delegation to СВЕТЛАНА.     Thank you for this referral.  Wendy Valdovinos OT  Time Calculation: 13 mins

## 2019-06-08 NOTE — PROGRESS NOTES
Bedside and Verbal shift change report given to Jenniffer Henderson RN (oncoming nurse) by Yuliet Arroyo RN (offgoing nurse). Report included the following information SBAR, Kardex, Procedure Summary, Intake/Output, MAR and Recent Results.

## 2019-06-08 NOTE — PROGRESS NOTES
Problem: Mobility Impaired (Adult and Pediatric)  Goal: *Therapy Goal (Edit Goal, Insert Text)  Description  Physical Therapy Goals  Initiated 6/8/2019  1. Patient will move from supine to sit and sit to supine  in bed with supervision/set-up within 7 day(s). 2.  Patient will transfer from bed to chair and chair to bed with supervision/set-up using the least restrictive device within 7 day(s). 3.  Patient will perform sit to stand with modified independence within 7 day(s). 4.  Patient will ambulate with modified independence for 75 feet with the least restrictive device within 7 day(s). 5.  Patient will ascend/descend 1 step with no handrail(s) with supervision/set-up within 7 day(s). 6/8/2019 0900 by Anirudh San PT  Outcome: Progressing Towards Goal       PHYSICAL THERAPY EVALUATION  Patient: Manda Rothman (27 y.o. female)  Date: 6/8/2019  Primary Diagnosis: Perforated gastric ulcer ([facility] [address] [K25.5]  Procedure(s) (LRB):  LAPAROSCOPY PERFORATED GASTRIC ULCER REPAIR (N/A) 2 Days Post-Op   Precautions:    Fall    ASSESSMENT :  Based on the objective data described below, the patient presents s/p abdominal surgery but has H/O 40 pound weight loss in past two months. Pt is very weak and deconditioned which increases risk for falls with functional activities. Pt required min assist for bed mobility to come to sitting on EOB and was CGA for transfers out of bed and bed to chair. Pt reports she lives with her  but plans to stay with her mother when discharged in a one story home with one step to enter. Pt does not have any DME. Pt will benefit with therapy for to address functional mobility limitations and training to maximize functional independence. Patient will benefit from skilled intervention to address the above impairments. Patient?s rehabilitation potential is considered to be Good  Factors which may influence rehabilitation potential include:   ? None noted  ?          Mental ability/status  ? Medical condition  ? Home/family situation and support systems  ? Safety awareness  ? Pain tolerance/management  ? Other:      PLAN :  Recommendations and Planned Interventions:  ?           Bed Mobility Training             ? Neuromuscular Re-Education  ? Transfer Training                   ? Orthotic/Prosthetic Training  ? Gait Training                         ? Modalities  ? Therapeutic Exercises           ? Edema Management/Control  ? Therapeutic Activities            ? Patient and Family Training/Education  ? Other (comment):    Frequency/Duration: Patient will be followed by physical therapy  daily to address goals. Discharge Recommendations: To Be Determined  Further Equipment Recommendations for Discharge: TBD      SUBJECTIVE:   Patient stated ? My stomach hurts a lot when I move; it feels better lying in bed.?    OBJECTIVE DATA SUMMARY:   HISTORY:    Past Medical History:   Diagnosis Date    Dental bridge present     Gastric ulcer 06/06/2019    Generalized OA     History of vascular access device 06/07/2019    [facility] VAT 5 FR triple PICC 66HI R Basilic for TPN    Hyperlipidemia     Hypertension      Past Surgical History:   Procedure Laterality Date    HX ENDOSCOPY       Prior Level of Function/Home Situation: independent  Personal factors and/or comorbidities impacting plan of care: significant weight loss and deconditioning    Home Situation  Home Environment: Private residence(plans to stay with mother at mother's house)  # Steps to Enter: 1  Rails to Enter: No  Wheelchair Ramp: No  One/Two Story Residence: One story  Living Alone: No  Support Systems: Family member(s), Spouse/Significant Other/Partner  Patient Expects to be Discharged to[de-identified] Private residence(Mother's home)  Current DME Used/Available at Home: None  Tub or Shower Type: Tub/Shower combination(reports mother has both)    EXAMINATION/PRESENTATION/DECISION MAKING:   Critical Behavior:  Neurologic State: Drowsy, Lethargic  Orientation Level: Oriented to person, Oriented to place, Oriented to situation  Cognition: Appropriate for age attention/concentration  Safety/Judgement: Awareness of environment  Hearing: Auditory  Auditory Impairment: None  Skin:  age appropriate  Edema: no edema noted during this assessment  Range Of Motion:  AROM: Generally decreased, functional           PROM: Within functional limits           Strength:    Strength: Generally decreased, functional                    Tone & Sensation:   Tone: Normal              Sensation: Intact               Coordination:  Coordination: Generally decreased, functional  Vision:   Corrective Lenses: Reading glasses  Functional Mobility:  Bed Mobility:  Rolling: Minimum assistance  Supine to Sit: Minimum assistance     Scooting: Stand-by assistance  Transfers:  Sit to Stand: Contact guard assistance  Stand to Sit: Contact guard assistance  Stand Pivot Transfers: Contact guard assistance  Stand Pivot Transfers: Contact guard assistance  Bed to Chair: Contact guard assistance              Balance:   Sitting: Intact; Without support  Standing: Intact; Without support  Ambulation/Gait Training:  Distance (ft): 5 Feet (ft)  Assistive Device: Gait belt  Ambulation - Level of Assistance: Contact guard assistance     Gait Description (WDL): Exceptions to WDL  Gait Abnormalities: Decreased step clearance        Base of Support: Narrowed     Speed/Rubi: Slow  Step Length: Left shortened;Right shortened                     Stairs:      Not assessed during this Rx session        Therapeutic Exercises:   Not done during this Rx session.     Functional Measure:  Barthel Index:    Bathin  Bladder: 5  Bowels: 0  Groomin  Dressin  Feedin  Mobility: 10  Stairs: 0  Toilet Use: 5  Transfer (Bed to Chair and Back): 10  Total: 35/100       Percentage of impairment   0% 1-19%   20-39%   40-59%   60-79%   80-99%   100%   Barthel Score 0-100 100 99-80 79-60 59-40 20-39 1-19   0     The Barthel ADL Index: Guidelines  1. The index should be used as a record of what a patient does, not as a record of what a patient could do. 2. The main aim is to establish degree of independence from any help, physical or verbal, however minor and for whatever reason. 3. The need for supervision renders the patient not independent. 4. A patient's performance should be established using the best available evidence. Asking the patient, friends/relatives and nurses are the usual sources, but direct observation and common sense are also important. However direct testing is not needed. 5. Usually the patient's performance over the preceding 24-48 hours is important, but occasionally longer periods will be relevant. 6. Middle categories imply that the patient supplies over 50 per cent of the effort. 7. Use of aids to be independent is allowed. Link ., Barthel, D.W. (4959). Functional evaluation: the Barthel Index. [address] [facility] (14)2. Mariela Lee dominique BENJAMIN Uribe, Devin Ludwig., Too Rice., [place], [facility] (1999). Measuring the change indisability after inpatient rehabilitation; comparison of the responsiveness of the Barthel Index and Functional [place] Measure. Journal of Neurology, Neurosurgery, and Psychiatry, 66(4), 239-687. Belen Koehler, N.J.A, BETY Feliciano.MIRIAM, & Taylor Sams M.A. (2004.) Assessment of post-stroke quality of life in cost-effectiveness studies: The usefulness of the Barthel Index and the EuroQoL-5D.  Quality of Life Research, 15, 424-95           Physical Therapy Evaluation Charge Determination   History Examination Presentation Decision-Making   LOW Complexity : Zero comorbidities / personal factors that will impact the outcome / POC LOW Complexity : 1-2 Standardized tests and measures addressing body structure, function, activity limitation and / or participation in recreation  LOW Complexity : Stable, uncomplicated  LOW Complexity : FOTO score of       Based on the above components, the patient evaluation is determined to be of the following complexity level: LOW     Pain:  Pain Scale 1: Numeric (0 - 10)  Pain Intensity 1: 0  Pain Location 1: Abdomen        Pain Intervention(s) 1: Encouraged PCA  Activity Tolerance:   Pt has limited activity tolerance 2 + 2 generalized weakness and deconditioning. Please refer to the flowsheet for vital signs taken during this treatment. After treatment:   ?         Patient left in no apparent distress sitting up in chair  ? Patient left in no apparent distress in bed  ? Call bell left within reach  ? Nursing notified  ? Caregiver present  ? Bed alarm activated    COMMUNICATION/EDUCATION:   The patient?s plan of care was discussed with: Occupational Therapist and Registered Nurse. ?         Fall prevention education was provided and the patient/caregiver indicated understanding. ? Patient/family have participated as able in goal setting and plan of care. ?         Patient/family agree to work toward stated goals and plan of care. ?         Patient understands intent and goals of therapy, but is neutral about his/her participation. ? Patient is unable to participate in goal setting and plan of care.     Thank you for this referral.  Siddharth Cox, PT   Time Calculation: 26 mins

## 2019-06-09 LAB
ANION GAP SERPL CALC-SCNC: 5 MMOL/L (ref 5–15)
BUN SERPL-MCNC: 9 MG/DL (ref 6–20)
BUN/CREAT SERPL: 26 (ref 12–20)
CALCIUM SERPL-MCNC: 8.7 MG/DL (ref 8.5–10.1)
CHLORIDE SERPL-SCNC: 109 MMOL/L (ref 97–108)
CO2 SERPL-SCNC: 30 MMOL/L (ref 21–32)
CREAT SERPL-MCNC: 0.35 MG/DL (ref 0.55–1.02)
GLUCOSE BLD STRIP.AUTO-MCNC: 135 MG/DL (ref 65–100)
GLUCOSE BLD STRIP.AUTO-MCNC: 140 MG/DL (ref 65–100)
GLUCOSE BLD STRIP.AUTO-MCNC: 148 MG/DL (ref 65–100)
GLUCOSE SERPL-MCNC: 109 MG/DL (ref 65–100)
MAGNESIUM SERPL-MCNC: 2.2 MG/DL (ref 1.6–2.4)
PHOSPHATE SERPL-MCNC: 2.4 MG/DL (ref 2.6–4.7)
POTASSIUM SERPL-SCNC: 4.1 MMOL/L (ref 3.5–5.1)
SERVICE CMNT-IMP: ABNORMAL
SODIUM SERPL-SCNC: 144 MMOL/L (ref 136–145)

## 2019-06-09 PROCEDURE — 65270000029 HC RM PRIVATE

## 2019-06-09 PROCEDURE — 74011000250 HC RX REV CODE- 250: Performed by: SURGERY

## 2019-06-09 PROCEDURE — 74011000258 HC RX REV CODE- 258: Performed by: SURGERY

## 2019-06-09 PROCEDURE — 36415 COLL VENOUS BLD VENIPUNCTURE: CPT

## 2019-06-09 PROCEDURE — 74011250636 HC RX REV CODE- 250/636: Performed by: SURGERY

## 2019-06-09 PROCEDURE — 84100 ASSAY OF PHOSPHORUS: CPT

## 2019-06-09 PROCEDURE — 80048 BASIC METABOLIC PNL TOTAL CA: CPT

## 2019-06-09 PROCEDURE — 83735 ASSAY OF MAGNESIUM: CPT

## 2019-06-09 PROCEDURE — 82962 GLUCOSE BLOOD TEST: CPT

## 2019-06-09 PROCEDURE — C9113 INJ PANTOPRAZOLE SODIUM, VIA: HCPCS | Performed by: SURGERY

## 2019-06-09 RX ORDER — MAGNESIUM SULFATE 100 %
4 CRYSTALS MISCELLANEOUS AS NEEDED
Status: DISCONTINUED | OUTPATIENT
Start: 2019-06-09 | End: 2019-06-13 | Stop reason: HOSPADM

## 2019-06-09 RX ORDER — DEXTROSE 50 % IN WATER (D50W) INTRAVENOUS SYRINGE
12.5-25 AS NEEDED
Status: DISCONTINUED | OUTPATIENT
Start: 2019-06-09 | End: 2019-06-13 | Stop reason: HOSPADM

## 2019-06-09 RX ORDER — INSULIN LISPRO 100 [IU]/ML
INJECTION, SOLUTION INTRAVENOUS; SUBCUTANEOUS EVERY 6 HOURS
Status: DISCONTINUED | OUTPATIENT
Start: 2019-06-09 | End: 2019-06-13 | Stop reason: HOSPADM

## 2019-06-09 RX ADMIN — PIPERACILLIN SODIUM,TAZOBACTAM SODIUM 3.38 G: 3; .375 INJECTION, POWDER, FOR SOLUTION INTRAVENOUS at 22:58

## 2019-06-09 RX ADMIN — CALCIUM GLUCONATE: 94 INJECTION, SOLUTION INTRAVENOUS at 17:34

## 2019-06-09 RX ADMIN — PIPERACILLIN SODIUM,TAZOBACTAM SODIUM 3.38 G: 3; .375 INJECTION, POWDER, FOR SOLUTION INTRAVENOUS at 06:52

## 2019-06-09 RX ADMIN — SODIUM CHLORIDE, SODIUM LACTATE, POTASSIUM CHLORIDE, AND CALCIUM CHLORIDE 125 ML/HR: 600; 310; 30; 20 INJECTION, SOLUTION INTRAVENOUS at 11:39

## 2019-06-09 RX ADMIN — SODIUM CHLORIDE 40 MG: 9 INJECTION INTRAMUSCULAR; INTRAVENOUS; SUBCUTANEOUS at 20:42

## 2019-06-09 RX ADMIN — SODIUM CHLORIDE: 900 INJECTION, SOLUTION INTRAVENOUS at 14:34

## 2019-06-09 RX ADMIN — SODIUM CHLORIDE 40 MG: 9 INJECTION INTRAMUSCULAR; INTRAVENOUS; SUBCUTANEOUS at 09:36

## 2019-06-09 RX ADMIN — METOCLOPRAMIDE 5 MG: 5 INJECTION, SOLUTION INTRAMUSCULAR; INTRAVENOUS at 22:58

## 2019-06-09 RX ADMIN — ONDANSETRON 4 MG: 2 INJECTION INTRAMUSCULAR; INTRAVENOUS at 17:33

## 2019-06-09 RX ADMIN — METOCLOPRAMIDE 5 MG: 5 INJECTION, SOLUTION INTRAMUSCULAR; INTRAVENOUS at 00:12

## 2019-06-09 RX ADMIN — PIPERACILLIN SODIUM,TAZOBACTAM SODIUM 3.38 G: 3; .375 INJECTION, POWDER, FOR SOLUTION INTRAVENOUS at 14:40

## 2019-06-09 NOTE — PROGRESS NOTES
General Surgery Daily Progress Note    Patient: Mark Sosa MRN: [id number]  SSN: [id number]    YOB: 1955  Age: 75 y.o. Sex: female      Admit Date: 6/6/2019    Subjective:   Reports nausea comes and goes, slightly better than yesterday  JPs in place and draining serous fluid    Current Facility-Administered Medications   Medication Dose Route Frequency    TPN ADULT - CENTRAL   IntraVENous CONTINUOUS    ondansetron (ZOFRAN) injection 4 mg  4 mg IntraVENous Q4H PRN    acetaminophen (TYLENOL) suppository 650 mg  650 mg Rectal Q4H PRN    diclofenac (VOLTAREN) 1 % topical gel 2 g  2 g Topical QID PRN    alteplase (CATHFLO) 1 mg in sterile water (preservative free) 1 mL injection  1 mg InterCATHeter PRN    prochlorperazine (COMPAZINE) injection 5 mg  5 mg IntraVENous Q6H PRN    metoclopramide HCl (REGLAN) injection 5 mg  5 mg IntraVENous Q6H PRN    fat emulsion 20% (LIPOSYN, INTRAlipid) infusion 500 mL  500 mL IntraVENous Q MON, WED & FRI    pantoprazole (PROTONIX) 40 mg in sodium chloride 0.9% 10 mL injection  40 mg IntraVENous Q12H    lactated Ringers infusion  125 mL/hr IntraVENous CONTINUOUS    HYDROmorphone (PF) 25 mg/50 mL (DILAUDID) PCA   IntraVENous TITRATE    piperacillin-tazobactam (ZOSYN) 3.375 g in 0.9% sodium chloride (MBP/ADV) 100 mL  3.375 g IntraVENous Q8H        Objective:   No intake/output data recorded. 06/07 1901 - 06/09 0700  In: -   Out: 9283 [Urine:1200; Drains:375]  Patient Vitals for the past 8 hrs:   BP Temp Pulse Resp SpO2   06/09/19 1120 158/75 98.4 °F (36.9 °C) 69 20 97 %   06/09/19 0804 112/67 98.1 °F (36.7 °C) 67 12 95 %       Physical Exam:  General: Alert, cooperative, NAD  Abdomen: Soft, appropriately tender, non-distended.  Incisions C/D/I  Extremities: Warm, moves all, no edema  Skin:  Warm and dry, no rash    Labs:   Recent Labs     06/08/19  0245   WBC 7.6   HGB 10.1*   HCT 31.1*   *     Recent Labs     06/09/19  0333  06/06/19  1407      < > 135*   K 4.1   < > 4.4   *   < > 100   CO2 30   < > 27   *   < > 160*   BUN 9   < > 8   CREA 0.35*   < > 0.67   CA 8.7   < > 9.6   MG 2.2   < >  --    PHOS 2.4*   < >  --    ALB  --   --  3.1*   TBILI  --   --  0.7   SGOT  --   --  17   ALT  --   --  14    < > = values in this interval not displayed.        Assessment / Plan:   · D/C gomez  · Continue TPN   · Continue JPs

## 2019-06-10 ENCOUNTER — APPOINTMENT (OUTPATIENT)
Dept: GENERAL RADIOLOGY | Age: 64
DRG: 329 | End: 2019-06-10
Attending: SURGERY
Payer: COMMERCIAL

## 2019-06-10 LAB
ANION GAP SERPL CALC-SCNC: 8 MMOL/L (ref 5–15)
BUN SERPL-MCNC: 7 MG/DL (ref 6–20)
BUN/CREAT SERPL: 20 (ref 12–20)
CALCIUM SERPL-MCNC: 8.3 MG/DL (ref 8.5–10.1)
CHLORIDE SERPL-SCNC: 105 MMOL/L (ref 97–108)
CO2 SERPL-SCNC: 28 MMOL/L (ref 21–32)
CREAT SERPL-MCNC: 0.35 MG/DL (ref 0.55–1.02)
GLUCOSE BLD STRIP.AUTO-MCNC: 102 MG/DL (ref 65–100)
GLUCOSE BLD STRIP.AUTO-MCNC: 105 MG/DL (ref 65–100)
GLUCOSE BLD STRIP.AUTO-MCNC: 121 MG/DL (ref 65–100)
GLUCOSE BLD STRIP.AUTO-MCNC: 126 MG/DL (ref 65–100)
GLUCOSE BLD STRIP.AUTO-MCNC: 135 MG/DL (ref 65–100)
GLUCOSE SERPL-MCNC: 96 MG/DL (ref 65–100)
LEFT ABI: 1.33
LEFT ANTERIOR TIBIAL: 153 MMHG
LEFT ARM BP: 122 MMHG
LEFT CALF PRESSURE: 179 MMHG
LEFT LOW THIGH PRESSURE: 169 MMHG
LEFT POSTERIOR TIBIAL: 162 MMHG
LEFT TBI: 0.78
LEFT TOE PRESSURE: 95 MMHG
MAGNESIUM SERPL-MCNC: 1.9 MG/DL (ref 1.6–2.4)
PHOSPHATE SERPL-MCNC: 3 MG/DL (ref 2.6–4.7)
POTASSIUM SERPL-SCNC: 4.1 MMOL/L (ref 3.5–5.1)
RIGHT ABI: 1.37
RIGHT ANTERIOR TIBIAL: 163 MMHG
RIGHT CALF PRESSURE: 177 MMHG
RIGHT HIGH THIGH PRESSURE: 170 MMHG
RIGHT LOW THIGH PRESSURE: 186 MMHG
RIGHT POSTERIOR TIBIAL: 167 MMHG
RIGHT TBI: 0.73
RIGHT TOE PRESSURE: 89 MMHG
SERVICE CMNT-IMP: ABNORMAL
SODIUM SERPL-SCNC: 141 MMOL/L (ref 136–145)
TRIGL SERPL-MCNC: 95 MG/DL (ref ?–150)

## 2019-06-10 PROCEDURE — 84478 ASSAY OF TRIGLYCERIDES: CPT

## 2019-06-10 PROCEDURE — 74011000250 HC RX REV CODE- 250: Performed by: SURGERY

## 2019-06-10 PROCEDURE — 36415 COLL VENOUS BLD VENIPUNCTURE: CPT

## 2019-06-10 PROCEDURE — 74011250636 HC RX REV CODE- 250/636: Performed by: SURGERY

## 2019-06-10 PROCEDURE — C9113 INJ PANTOPRAZOLE SODIUM, VIA: HCPCS | Performed by: SURGERY

## 2019-06-10 PROCEDURE — 84100 ASSAY OF PHOSPHORUS: CPT

## 2019-06-10 PROCEDURE — 65270000029 HC RM PRIVATE

## 2019-06-10 PROCEDURE — 83735 ASSAY OF MAGNESIUM: CPT

## 2019-06-10 PROCEDURE — 97535 SELF CARE MNGMENT TRAINING: CPT

## 2019-06-10 PROCEDURE — 82962 GLUCOSE BLOOD TEST: CPT

## 2019-06-10 PROCEDURE — 97530 THERAPEUTIC ACTIVITIES: CPT

## 2019-06-10 PROCEDURE — 80048 BASIC METABOLIC PNL TOTAL CA: CPT

## 2019-06-10 PROCEDURE — 97116 GAIT TRAINING THERAPY: CPT

## 2019-06-10 PROCEDURE — 74241 XR UPPER GI W KUB/ BA SWALLOW: CPT

## 2019-06-10 PROCEDURE — 74011250637 HC RX REV CODE- 250/637: Performed by: SURGERY

## 2019-06-10 PROCEDURE — 74011000258 HC RX REV CODE- 258: Performed by: SURGERY

## 2019-06-10 RX ORDER — GABAPENTIN 250 MG/5ML
250 SOLUTION ORAL
Status: DISCONTINUED | OUTPATIENT
Start: 2019-06-11 | End: 2019-06-13 | Stop reason: HOSPADM

## 2019-06-10 RX ORDER — GABAPENTIN 250 MG/5ML
250 SOLUTION ORAL ONCE
Status: COMPLETED | OUTPATIENT
Start: 2019-06-10 | End: 2019-06-10

## 2019-06-10 RX ADMIN — GABAPENTIN 250 MG: 250 SOLUTION ORAL at 21:55

## 2019-06-10 RX ADMIN — SODIUM CHLORIDE 40 MG: 9 INJECTION INTRAMUSCULAR; INTRAVENOUS; SUBCUTANEOUS at 08:48

## 2019-06-10 RX ADMIN — SODIUM CHLORIDE, SODIUM LACTATE, POTASSIUM CHLORIDE, AND CALCIUM CHLORIDE 125 ML/HR: 600; 310; 30; 20 INJECTION, SOLUTION INTRAVENOUS at 09:50

## 2019-06-10 RX ADMIN — ONDANSETRON 4 MG: 2 INJECTION INTRAMUSCULAR; INTRAVENOUS at 09:30

## 2019-06-10 RX ADMIN — I.V. FAT EMULSION 500 ML: 20 EMULSION INTRAVENOUS at 22:41

## 2019-06-10 RX ADMIN — PIPERACILLIN SODIUM,TAZOBACTAM SODIUM 3.38 G: 3; .375 INJECTION, POWDER, FOR SOLUTION INTRAVENOUS at 06:36

## 2019-06-10 RX ADMIN — PIPERACILLIN SODIUM,TAZOBACTAM SODIUM 3.38 G: 3; .375 INJECTION, POWDER, FOR SOLUTION INTRAVENOUS at 14:45

## 2019-06-10 RX ADMIN — SODIUM CHLORIDE 40 MG: 9 INJECTION INTRAMUSCULAR; INTRAVENOUS; SUBCUTANEOUS at 22:30

## 2019-06-10 RX ADMIN — ACETAMINOPHEN 650 MG: 650 SUPPOSITORY RECTAL at 09:31

## 2019-06-10 RX ADMIN — ASCORBIC ACID, VITAMIN A PALMITATE, CHOLECALCIFEROL, THIAMINE HYDROCHLORIDE, RIBOFLAVIN-5 PHOSPHATE SODIUM, PYRIDOXINE HYDROCHLORIDE, NIACINAMIDE, DEXPANTHENOL, ALPHA-TOCOPHEROL ACETATE, VITAMIN K1, FOLIC ACID, BIOTIN, CYANOCOBALAMIN: 200; 3300; 200; 6; 3.6; 6; 40; 15; 10; 150; 600; 60; 5 INJECTION, SOLUTION INTRAVENOUS at 22:31

## 2019-06-10 RX ADMIN — SODIUM CHLORIDE, SODIUM LACTATE, POTASSIUM CHLORIDE, AND CALCIUM CHLORIDE 125 ML/HR: 600; 310; 30; 20 INJECTION, SOLUTION INTRAVENOUS at 06:03

## 2019-06-10 NOTE — PROGRESS NOTES
Started Mrs. Duffy on liquids today. Pain is more in the right inner knee. I spent 30 minutes with her today, discussing pathology of signet ring carcinoma. I also answered any other questions she may have. She was tearful, but able to call her sister and relay what she could. Plan is for CT chest with contrast in AM to complete CT abdomen/pelvis for staging. She has appointment with Dr. Jose Edmond Wednesday, June 19th for surgical oncology. Plain films right knee   Gabapentin 300 mg. Renew TPN for now, until she is eating.       Kanu Ontiveros MD

## 2019-06-10 NOTE — PROGRESS NOTES
6/10/2019  4:26 PM  CM reviewed EMR. Awaiting DC order when pt medically stable. No Dc needs anticipated.

## 2019-06-10 NOTE — PROGRESS NOTES
Problem: Self Care Deficits Care Plan (Adult)  Goal: *Acute Goals and Plan of Care (Insert Text)  Description  Occupational Therapy Goals  Initiated 6/8/2019    1. Patient will utilize energy conservation techniques during functional activities with verbal cues within 7 day(s). 2.  Patient will perform upper body dressing and bathing with independence within 7 day(s). 3.  Patient will perform lower body dressing, using AE prn, with modified independence within 7 day(s). 4.  Patient will perform toilet transfers with modified independence within 7 day(s). 5.  Patient will perform all aspects of toileting with modified independence within 7 day(s). 6.  Patient will participate in upper extremity therapeutic exercise/activities with modified independence for 10-15 minutes within 7 day(s). Note:   OCCUPATIONAL THERAPY TREATMENT  Patient: Saba Perera (45 y.o. female)  Date: 6/10/2019  Diagnosis: Perforated gastric ulcer ([address] [K25.5] <principal problem not specified>  Procedure(s) (LRB):  LAPAROSCOPY PERFORATED GASTRIC ULCER REPAIR (N/A) 4 Days Post-Op  Precautions:    Chart, occupational therapy assessment, plan of care, and goals were reviewed. ASSESSMENT:  Pt seated in chair agreeable to OT. She was able to doff/don socks with feet elevated in recliner chair. Educated pt as AE however she stated \" I do not think I am going to need that and I am not going to dress at home any ways. \" Pt works at a local hospital and verbalized she is \"familiar with what PT does. \" Pt verbalized she was able to transfer to toilet before her walk with PT. Pt likely will not need OT upon discharge. Progression toward goals:  ?       Improving appropriately and progressing toward goals  ? Improving slowly and progressing toward goals  ? Not making progress toward goals and plan of care will be adjusted     PLAN:  Patient continues to benefit from skilled intervention to address the above impairments. Continue treatment per established plan of care. Discharge Recommendations:  Likely none for OT  Further Equipment Recommendations for Discharge:  None     SUBJECTIVE:   Patient stated ? I know what PT does. ?    OBJECTIVE DATA SUMMARY:   Cognitive/Behavioral Status:  Neurologic State: Alert; Appropriate for age  Orientation Level: Oriented X4  Cognition: Appropriate decision making             Functional Mobility and Transfers for ADLs:  Bed Mobility:   Not tested as pt already in the chair      Balance:   Sitting balance intact    ADL Intervention:       Grooming  Brushing/Combing Hair: Supervision(seated in chair)                   Lower Body Dressing Assistance  Socks: Modified independent  Leg Crossed Method Used: No  Position Performed: Seated in chair          Activity Tolerance:   No signs/symptoms of distress or discomfort during OT  Please refer to the flowsheet for vital signs taken during this treatment. After treatment:   ? Patient left in no apparent distress sitting up in chair  ? Patient left in no apparent distress in bed  ? Call bell left within reach  ? Nursing notified  ? Caregiver present  ?  Bed alarm activated    COMMUNICATION/COLLABORATION:   The patient?s plan of care was discussed with: Occupational Therapist    VERONICA Fung/L  Time Calculation: 15 mins

## 2019-06-10 NOTE — PROGRESS NOTES
Nutrition Assessment:    RECOMMENDATIONS/INTERVENTION(S):   1. Monitor tolerance to CLD, advance as tolerated. Goal: recommend GI lite. 2. Recommend Barry TID (240 kcal +gluamine and arginine) and Gelatein TID (270 kcal and 60 gm protein). 3. Encourage/ monitor PO intake. 4. Monitor labs and weight trends. ASSESSMENT:   6/10: Pt seen for f/u. Noted pt advanced to CLD today, TPN d/c'd. Pt reports poor PO intake PTA d/t stomach pain.  lb. BMI 18.6 c/w underweight for age. Pt reports weighing 140 lb x 2-3 mo ago, indicating 25% weight loss which is severe for timeframe. Pt appears to have experienced muscle wasting. Pt has tried Ensure in the past and did not tolerate well, likes Barry. Will add Barry TID and Gelatein TID to promote intake. No note of GI distress. LBM 6/5. Labs and medications reviewed. 6/7: 76 y/o F admitted with perforated gastric ulcer. MD consult for perforated gastric ulcer and low BMI. PMH - gastric ulcer, hyperlipidemia, HTN. S/p laparoscopy perforated gastric ulcer repair 6/6. Per MD notes, pt with 40 lb weight loss x 2 mo, indicating severe weight loss 28% x 2 mo. No weight Hx per EMR.  lb. BMI 17.9 c/w underweight. Suspect limited PO intake r/t poor appetite, concern for refeeding. Estimated energy needs +250kcal to promote healthy weight. LBM 6/5. No note of n/v/c/d. Labs: Ca 8.3 L. Meds: ondansetron, pantoprazole, piperacillin-tazobactam.       Diet Order:  NPO  % Eaten:  No data found. Pertinent Medications: [x] Reviewed    Labs: [x] Reviewed    Anthropometrics: Height: 5' 3\" (160 cm) Weight: 47.6 kg (105 lb)    IBW (%IBW):   ( ) UBW (%UBW):   (  %)      BMI: Body mass index is 18.6 kg/m².     This BMI is indicative of:   [x] Underweight for age    [] Normal    [] Overweight    []  Obesity    []  Extreme Obesity (BMI>40)  Estimated Nutrition Needs (Based on): 2772 Kcals/day(REE 1078 x 1.2 + 250kcal ) , 60 g(55-64 (1.2 - 1.4 gm/kg)) Protein  Carbohydrate: At Least 130 g/day  Fluids: 1544 mL/day (1 ml/kcal)    Last BM: 6/5   []Active     []Hyperactive  [x]Hypoactive       [] Absent   BS  Skin:    [] Intact   [x] Incision - abdomen  [] Breakdown   [] DTI   [] Tears/Excoriation/Abrasion  []Edema [] Other:    Wt Readings from Last 30 Encounters:   06/08/19 47.6 kg (105 lb)      NUTRITION DIAGNOSES:   Problem:  Altered GI function     Etiology: related to alteration in GI sturucture/ function     Signs/Symptoms: as evidenced by perforated gastric ulcer      NUTRITION INTERVENTIONS:    Enteral/Parenteral Nutrition: Initiate parenteral nutrition                GOAL:   Pt will tolerate diet advancement without GI disturbances within 3-5 days    Cultural, Jew, or Ethnic Dietary Needs:   None     EDUCATION & DISCHARGE NEEDS:    [x] None Identified   [] Identified and Education Provided/Documented   [] Identified and Pt declined/was not appropriate      [x] Interdisciplinary Care Plan Reviewed/Documented    [x] Discharge Needs:    TBD - TPN d/c'd today, monitor for diet advancement   [] No Nutrition Related Discharge Needs    NUTRITION RISK:   Pt Is At Nutrition Risk  [x]     No Nutrition Risk Identified  []       PT SEEN FOR:    []  MD Consult: []Calorie Count      []Diabetic Diet Education        []Diet Education     []Electrolyte Management     []General Nutrition Management and Supplements     []Management of Tube Feeding     []TPN Recommendations    []  RN Referral:  []MST score >=2     []Enteral/Parenteral Nutrition PTA     []Pregnant: Gestational DM or Multigestation                 [] Pressure Ulcer    []  Low BMI      []  Length of Stay       [] Dysphagia Diet         [] Ventilator  [x]  Follow-up     Previous Recommendations:   [x] Implemented          [] Not Implemented          [] Not Applicable    Previous Goal:   [] Met              [x] Progressing Towards Goal              [] Not Progressing Towards Goal   [] Not Applicable Cory Martino, [place]  Pager [phone]  Phone [phone]

## 2019-06-10 NOTE — PROGRESS NOTES
Problem: Mobility Impaired (Adult and Pediatric)  Goal: *Therapy Goal (Edit Goal, Insert Text)  Description  Physical Therapy Goals  Initiated 6/8/2019  1. Patient will move from supine to sit and sit to supine  in bed with supervision/set-up within 7 day(s). 2.  Patient will transfer from bed to chair and chair to bed with supervision/set-up using the least restrictive device within 7 day(s). 3.  Patient will perform sit to stand with modified independence within 7 day(s). 4.  Patient will ambulate with modified independence for 75 feet with the least restrictive device within 7 day(s). 5.  Patient will ascend/descend 1 step with no handrail(s) with supervision/set-up within 7 day(s). Outcome: Progressing Towards Goal  PHYSICAL THERAPY TREATMENT  Patient: Manda Rothman (29 y.o. female)  Date: 6/10/2019  Diagnosis: Perforated gastric ulcer ([facility] [address] [K25.5] <principal problem not specified>  Procedure(s) (LRB):  LAPAROSCOPY PERFORATED GASTRIC ULCER REPAIR (N/A) 4 Days Post-Op  Precautions:  Universal  Chart, physical therapy assessment, plan of care and goals were reviewed. ASSESSMENT:  Pt Manda Rothman ambulates increased distance and requires up to SBA for mobility as below. She tolerates treatment without complaints and offers good participation. Progression toward goals:  ?    Improving appropriately and progressing toward goals  ? Improving slowly and progressing toward goals  ? Not making progress toward goals and plan of care will be adjusted     PLAN:  Patient continues to benefit from skilled intervention to address the above impairments. Continue treatment per established plan of care. Discharge Recommendations:  None  Further Equipment Recommendations for Discharge:  none      SUBJECTIVE:   Patient stated ? It's not as bad today,? re: nausea. Pt received supine, agreeable to PT and cleared by RN.       OBJECTIVE DATA SUMMARY:   Critical Behavior:  Neurologic State: Alert, Appropriate for age  Orientation Level: Oriented X4  Cognition: Appropriate decision making  Safety/Judgement: Awareness of environment  Functional Mobility Training:  Bed Mobility:     Supine to Sit: Stand-by assistance;Bed Modified     Scooting: Supervision        Transfers:  Sit to Stand: Stand-by assistance  Stand to Sit: Stand-by assistance               Commode transfer with SBA                Balance:  Sitting: Intact  Standing: Without support  Standing - Static: Good  Standing - Dynamic : Good    Hand hygiene and dynamic balance at sink with SBA  Ambulation/Gait Training:  Distance (ft): 160 Feet (ft)  Assistive Device: Gait belt  Ambulation - Level of Assistance: Stand-by assistance                       Speed/Rubi: Pace decreased (<100 feet/min)                                 Pain:      3/10 abdomen                Activity Tolerance:   No pt complaints. Please refer to the flowsheet for vital signs taken during this treatment. After treatment:   ?    Patient left in no apparent distress sitting up in chair  ? Patient left in no apparent distress in bed  ? Call bell left within reach  ? Nursing notified  ? Caregiver present  ?     chair alarm activated    COMMUNICATION/COLLABORATION:   The patient?s plan of care was discussed with: Certified Occupational Therapy Assistant and Registered Nurse    Leighton Cartwright, PT, DPT   Time Calculation: 34 mins

## 2019-06-11 ENCOUNTER — APPOINTMENT (OUTPATIENT)
Dept: CT IMAGING | Age: 64
DRG: 329 | End: 2019-06-11
Attending: SURGERY
Payer: COMMERCIAL

## 2019-06-11 ENCOUNTER — APPOINTMENT (OUTPATIENT)
Dept: GENERAL RADIOLOGY | Age: 64
DRG: 329 | End: 2019-06-11
Attending: SURGERY
Payer: COMMERCIAL

## 2019-06-11 LAB
ANION GAP SERPL CALC-SCNC: 5 MMOL/L (ref 5–15)
BUN SERPL-MCNC: 9 MG/DL (ref 6–20)
BUN/CREAT SERPL: 28 (ref 12–20)
CALCIUM SERPL-MCNC: 9 MG/DL (ref 8.5–10.1)
CHLORIDE SERPL-SCNC: 107 MMOL/L (ref 97–108)
CO2 SERPL-SCNC: 28 MMOL/L (ref 21–32)
COMMENT, HOLDF: NORMAL
CREAT SERPL-MCNC: 0.32 MG/DL (ref 0.55–1.02)
GLUCOSE BLD STRIP.AUTO-MCNC: 103 MG/DL (ref 65–100)
GLUCOSE BLD STRIP.AUTO-MCNC: 111 MG/DL (ref 65–100)
GLUCOSE BLD STRIP.AUTO-MCNC: 130 MG/DL (ref 65–100)
GLUCOSE SERPL-MCNC: 113 MG/DL (ref 65–100)
MAGNESIUM SERPL-MCNC: 1.8 MG/DL (ref 1.6–2.4)
PHOSPHATE SERPL-MCNC: 2.3 MG/DL (ref 2.6–4.7)
POTASSIUM SERPL-SCNC: 3.2 MMOL/L (ref 3.5–5.1)
SAMPLES BEING HELD,HOLD: NORMAL
SERVICE CMNT-IMP: ABNORMAL
SODIUM SERPL-SCNC: 140 MMOL/L (ref 136–145)

## 2019-06-11 PROCEDURE — 80048 BASIC METABOLIC PNL TOTAL CA: CPT

## 2019-06-11 PROCEDURE — 74011000258 HC RX REV CODE- 258: Performed by: SURGERY

## 2019-06-11 PROCEDURE — 74011250637 HC RX REV CODE- 250/637: Performed by: SURGERY

## 2019-06-11 PROCEDURE — 83735 ASSAY OF MAGNESIUM: CPT

## 2019-06-11 PROCEDURE — 74011250636 HC RX REV CODE- 250/636: Performed by: SURGERY

## 2019-06-11 PROCEDURE — 65270000029 HC RM PRIVATE

## 2019-06-11 PROCEDURE — 71260 CT THORAX DX C+: CPT

## 2019-06-11 PROCEDURE — 73560 X-RAY EXAM OF KNEE 1 OR 2: CPT

## 2019-06-11 PROCEDURE — 84100 ASSAY OF PHOSPHORUS: CPT

## 2019-06-11 PROCEDURE — 74011636320 HC RX REV CODE- 636/320: Performed by: RADIOLOGY

## 2019-06-11 PROCEDURE — 74011000250 HC RX REV CODE- 250: Performed by: SURGERY

## 2019-06-11 PROCEDURE — C9113 INJ PANTOPRAZOLE SODIUM, VIA: HCPCS | Performed by: SURGERY

## 2019-06-11 PROCEDURE — 97535 SELF CARE MNGMENT TRAINING: CPT

## 2019-06-11 PROCEDURE — 82962 GLUCOSE BLOOD TEST: CPT

## 2019-06-11 RX ADMIN — SODIUM CHLORIDE 40 MG: 9 INJECTION INTRAMUSCULAR; INTRAVENOUS; SUBCUTANEOUS at 22:30

## 2019-06-11 RX ADMIN — SODIUM CHLORIDE 40 MG: 9 INJECTION INTRAMUSCULAR; INTRAVENOUS; SUBCUTANEOUS at 08:26

## 2019-06-11 RX ADMIN — IOHEXOL 50 ML: 240 INJECTION, SOLUTION INTRATHECAL; INTRAVASCULAR; INTRAVENOUS; ORAL at 12:42

## 2019-06-11 RX ADMIN — GABAPENTIN 250 MG: 250 SOLUTION ORAL at 22:30

## 2019-06-11 RX ADMIN — IOPAMIDOL 100 ML: 755 INJECTION, SOLUTION INTRAVENOUS at 14:49

## 2019-06-11 RX ADMIN — PROCHLORPERAZINE EDISYLATE 5 MG: 5 INJECTION INTRAMUSCULAR; INTRAVENOUS at 03:12

## 2019-06-11 RX ADMIN — POTASSIUM CHLORIDE: 2 INJECTION, SOLUTION, CONCENTRATE INTRAVENOUS at 18:02

## 2019-06-11 NOTE — PROGRESS NOTES
attempted an initial spiritual assessment for patient, Gay Salinas, on the Post. Surgical floor. Gay Salinas appeared to be sleeping comfortably when the  came to visit and there were no family members present at this time.  left a spiritual care card on Jose's bedside table.  will follow up as able and/or needed. Abby Le. Annette Ronquillo.      Paging Service: [phone])

## 2019-06-11 NOTE — PROGRESS NOTES
Bedside and Verbal shift change report given to [facility] (oncoming nurse) by Magda Ballard (offgoing nurse). Report included the following information SBAR, Kardex and Recent Results.

## 2019-06-11 NOTE — PROGRESS NOTES
Patient sleeping. Tolerating diet, advance to full liquids. CT C/A/P pending. XR images knee reviewed. Once eating normally will work on discharge.      Grace Brooks MD

## 2019-06-11 NOTE — PROGRESS NOTES
Bedside shift change report given to Juancho Gregory RN (oncoming nurse) by Genesis Saldivar RN (offgoing nurse). Report included the following information SBAR, MAR, Accordion, Recent Results and Med Rec Status.

## 2019-06-11 NOTE — PROGRESS NOTES
Problem: Self Care Deficits Care Plan (Adult)  Goal: *Acute Goals and Plan of Care (Insert Text)  Description  Occupational Therapy Goals  Initiated 6/8/2019    1. Patient will utilize energy conservation techniques during functional activities with verbal cues within 7 day(s). 2.  Patient will perform upper body dressing and bathing with independence within 7 day(s). 3.  Patient will perform lower body dressing, using AE prn, with modified independence within 7 day(s). 4.  Patient will perform toilet transfers with modified independence within 7 day(s). 5.  Patient will perform all aspects of toileting with modified independence within 7 day(s). 6.  Patient will participate in upper extremity therapeutic exercise/activities with modified independence for 10-15 minutes within 7 day(s). Outcome: Progressing Towards Goal   OCCUPATIONAL THERAPY TREATMENT  Patient: Luis Manuel Mathew (83 y.o. female)  Date: 6/11/2019  Diagnosis: Perforated gastric ulcer ([facility] [address] [K25.5] <principal problem not specified>  Procedure(s) (LRB):  LAPAROSCOPY PERFORATED GASTRIC ULCER REPAIR (N/A) 5 Days Post-Op  Precautions:    Chart, occupational therapy assessment, plan of care, and goals were reviewed. ASSESSMENT:  Pt seen for ADL re-training. Supine to sit and ambulated to bathroom, stood at sink to perform basic grooming. No dizziness verbalized. Pt transferred on/off commode and performed all aspects of toileting. Pt returned to sit in recliner chair with LE's elevated. Encouraged her to engage with UE exercises to increase strength and endurance for functional tasks. Likely no follow up OT will be needed. Progression toward goals:  ?       Improving appropriately and progressing toward goals  ? Improving slowly and progressing toward goals  ? Not making progress toward goals and plan of care will be adjusted     PLAN:  Patient continues to benefit from skilled intervention to address the above impairments. Continue treatment per established plan of care. Discharge Recommendations:  Likely no follow up OT needed  Further Equipment Recommendations for Discharge: None     SUBJECTIVE:   Patient stated ? Where is my pain button??    OBJECTIVE DATA SUMMARY:   Cognitive/Behavioral Status:  Neurologic State: Alert  Orientation Level: Oriented X4  Cognition: Appropriate decision making             Functional Mobility and Transfers for ADLs:  Bed Mobility:  Supine to Sit: Supervision    Transfers:  Sit to Stand: Stand-by assistance  Functional Transfers  Toilet Transfer : Stand-by assistance       Balance:  Sitting: Intact  Standing: Without support    ADL Intervention:       Grooming  Grooming Assistance: Supervision(standng at sink)  Washing Face: Supervision  Brushing Teeth: Supervision        Therapeutic Exercises:   Pt encouraged to engage with UE exercises to increase strength and endurance for functional tasks. Pain:  Pain Scale 1: Numeric (0 - 10)  Pain Intensity 1: 0              Activity Tolerance:   No signs/symptoms of distress or discomfort during OT  Please refer to the flowsheet for vital signs taken during this treatment. After treatment:   ? Patient left in no apparent distress sitting up in chair  ? Patient left in no apparent distress in bed  ? Call bell left within reach  ? Nursing notified  ? Caregiver present  ?  Chair alarm activated    COMMUNICATION/COLLABORATION:   The patient?s plan of care was discussed with: Occupational Therapist, Registered Nurse and Rehabilitation Attendant    BOB Fung  Time Calculation: 17 mins

## 2019-06-11 NOTE — PROGRESS NOTES
MOBILITY NOTE  Initial communication established with referring therapist Dave Duarte  Communication made with patient's Nurse Mirna Álvarez      Vitals  Pre activity     Post activity   BP  160/70   191/82  HR (BPM) 66   74  O2 sats 98   98  PAIN  5   5    Patient ambulated 400 feet with CGA and  gait belt. Post activity    (Yes) Alerted nurse  (Yes) Call bell and phone within patient reach. (Yes) Patient resting in bed with no apparent distress and fall precautions in place. NOTE:       Hanny Do.  [facility] [place]  Rehabilitation Technician

## 2019-06-12 LAB
ANION GAP SERPL CALC-SCNC: 2 MMOL/L (ref 5–15)
BUN SERPL-MCNC: 8 MG/DL (ref 6–20)
BUN/CREAT SERPL: 29 (ref 12–20)
CALCIUM SERPL-MCNC: 9 MG/DL (ref 8.5–10.1)
CHLORIDE SERPL-SCNC: 108 MMOL/L (ref 97–108)
CO2 SERPL-SCNC: 30 MMOL/L (ref 21–32)
COMMENT, HOLDF: NORMAL
CREAT SERPL-MCNC: 0.28 MG/DL (ref 0.55–1.02)
GLUCOSE BLD STRIP.AUTO-MCNC: 108 MG/DL (ref 65–100)
GLUCOSE BLD STRIP.AUTO-MCNC: 111 MG/DL (ref 65–100)
GLUCOSE BLD STRIP.AUTO-MCNC: 115 MG/DL (ref 65–100)
GLUCOSE BLD STRIP.AUTO-MCNC: 134 MG/DL (ref 65–100)
GLUCOSE SERPL-MCNC: 92 MG/DL (ref 65–100)
MAGNESIUM SERPL-MCNC: 2.1 MG/DL (ref 1.6–2.4)
PHOSPHATE SERPL-MCNC: 3.2 MG/DL (ref 2.6–4.7)
POTASSIUM SERPL-SCNC: 4 MMOL/L (ref 3.5–5.1)
SAMPLES BEING HELD,HOLD: NORMAL
SERVICE CMNT-IMP: ABNORMAL
SODIUM SERPL-SCNC: 140 MMOL/L (ref 136–145)

## 2019-06-12 PROCEDURE — C9113 INJ PANTOPRAZOLE SODIUM, VIA: HCPCS | Performed by: SURGERY

## 2019-06-12 PROCEDURE — 83735 ASSAY OF MAGNESIUM: CPT

## 2019-06-12 PROCEDURE — 84100 ASSAY OF PHOSPHORUS: CPT

## 2019-06-12 PROCEDURE — 80048 BASIC METABOLIC PNL TOTAL CA: CPT

## 2019-06-12 PROCEDURE — 97535 SELF CARE MNGMENT TRAINING: CPT

## 2019-06-12 PROCEDURE — 82962 GLUCOSE BLOOD TEST: CPT

## 2019-06-12 PROCEDURE — 74011250636 HC RX REV CODE- 250/636: Performed by: SURGERY

## 2019-06-12 PROCEDURE — 74011250637 HC RX REV CODE- 250/637: Performed by: SURGERY

## 2019-06-12 PROCEDURE — 65270000029 HC RM PRIVATE

## 2019-06-12 RX ORDER — OXYCODONE HYDROCHLORIDE 5 MG/1
5 TABLET ORAL
Status: DISCONTINUED | OUTPATIENT
Start: 2019-06-12 | End: 2019-06-13 | Stop reason: HOSPADM

## 2019-06-12 RX ORDER — PANTOPRAZOLE SODIUM 40 MG/1
40 TABLET, DELAYED RELEASE ORAL
Status: DISCONTINUED | OUTPATIENT
Start: 2019-06-13 | End: 2019-06-13 | Stop reason: HOSPADM

## 2019-06-12 RX ORDER — OXYCODONE HYDROCHLORIDE 5 MG/1
10 TABLET ORAL
Status: DISCONTINUED | OUTPATIENT
Start: 2019-06-12 | End: 2019-06-13 | Stop reason: HOSPADM

## 2019-06-12 RX ADMIN — PROCHLORPERAZINE EDISYLATE 5 MG: 5 INJECTION INTRAMUSCULAR; INTRAVENOUS at 21:05

## 2019-06-12 RX ADMIN — SODIUM CHLORIDE 40 MG: 9 INJECTION INTRAMUSCULAR; INTRAVENOUS; SUBCUTANEOUS at 10:01

## 2019-06-12 RX ADMIN — OXYCODONE HYDROCHLORIDE 10 MG: 5 TABLET ORAL at 21:17

## 2019-06-12 NOTE — H&P
Assessment:     Perforated gastric ulcer    Plan:     NPO/IVF  Antibiotics  Laparoscopic gastric ulcer repair    Signed By: Elfego Brooks MD  [address] [facility]  Office:  [phone]  Fax:  [phone]             General Surgery History and Physical    Subjective:      Susannah Lima is a 75 y.o.  female who presents with pneumoperitoneum and perotininits. On endoscopy, Dr. Paulina Love noted perforated gastric ulcer today. CT scan reveals perforation . Past Medical History:   Diagnosis Date    Dental bridge present     Gastric ulcer 06/06/2019    Generalized OA     History of vascular access device 06/07/2019    [facility] VAT 5 FR triple PICC 88HO R Basilic for TPN    Hyperlipidemia     Hypertension      Past Surgical History:   Procedure Laterality Date    HX ENDOSCOPY        History reviewed. No pertinent family history.   Social History     Socioeconomic History    Marital status:      Spouse name: Not on file    Number of children: Not on file    Years of education: Not on file    Highest education level: Not on file   Tobacco Use    Smoking status: Never Smoker    Smokeless tobacco: Never Used   Substance and Sexual Activity    Alcohol use: Not Currently    Drug use: Not Currently   Other Topics Concern      Current Facility-Administered Medications   Medication Dose Route Frequency    oxyCODONE IR (ROXICODONE) tablet 5 mg  5 mg Oral Q4H PRN    Or    oxyCODONE IR (ROXICODONE) tablet 10 mg  10 mg Oral Q4H PRN    [START ON 6/13/2019] pantoprazole (PROTONIX) tablet 40 mg  40 mg Oral ACB    gabapentin (NEURONTIN) 250 mg/5 mL solution 250 mg  250 mg Oral Q8H PRN    insulin lispro (HUMALOG) injection   SubCUTAneous Q6H    glucose chewable tablet 16 g  4 Tab Oral PRN    dextrose (D50W) injection syrg 12.5-25 g  12.5-25 g IntraVENous PRN    glucagon (GLUCAGEN) injection 1 mg  1 mg IntraMUSCular PRN    ondansetron (ZOFRAN) injection 4 mg  4 mg IntraVENous Q4H PRN    acetaminophen (TYLENOL) suppository 650 mg  650 mg Rectal Q4H PRN    diclofenac (VOLTAREN) 1 % topical gel 2 g  2 g Topical QID PRN    alteplase (CATHFLO) 1 mg in sterile water (preservative free) 1 mL injection  1 mg InterCATHeter PRN    prochlorperazine (COMPAZINE) injection 5 mg  5 mg IntraVENous Q6H PRN    metoclopramide HCl (REGLAN) injection 5 mg  5 mg IntraVENous Q6H PRN      No Known Allergies    Review of Systems:     []     Unable to obtain  ROS due to  []    mental status change  []    sedated   []    intubated   [x]    Total of 12 system negative, unless specified below or in HPI:  Constitutional: negative fever, negative chills, negative weight loss  Eyes:   negative visual changes  ENT:   negative sore throat, tongue or lip swelling  Respiratory:  negative cough, negative dyspnea  Cards:  negative for chest pain, palpitations, lower extremity edema  GI:   negative for nausea, vomiting, diarrhea, and abdominal pain  :  negative for frequency, dysuria  Integument:  negative for rash and pruritus  Heme:  negative for easy bruising and gum/nose bleeding  Musculoskel: negative for myalgias,  back pain and muscle weakness  Neuro:  negative for headaches, dizziness, vertigo  Psych:  negative for feelings of anxiety, depression     Objective:        Patient Vitals for the past 8 hrs:   BP Temp Pulse Resp SpO2   19 1453 130/73 98.4 °F (36.9 °C) (!) 104 18 98 %   19 1211 148/84 98.2 °F (36.8 °C) 95 18 99 %       Temp (24hrs), Av.5 °F (36.9 °C), Min:98.2 °F (36.8 °C), Max:99.2 °F (37.3 °C)      Physical Exam:  General:  Alert, cooperative, no distress, appears stated age. Eyes:  Conjunctivae/corneas clear. PERRL, EOMs intact. Nose: Nares normal. Septum midline. Mucosa normal. No drainage or sinus tenderness.    Mouth/Throat: Lips, mucosa, and tongue normal. Teeth and gums normal.   Neck: Supple, symmetrical, trachea midline, no adenopathy, thyroid: no enlargment/tenderness/nodules, no carotid bruit and no JVD. Back:   Symmetric, no curvature. ROM normal. No CVA tenderness. Lungs:   Clear to auscultation bilaterally. Heart:  Regular rate and rhythm, S1, S2 normal, no murmur, click, rub or gallop. Abdomen:   Perotinitis and guarding. Bowel sounds normal. No masses,  No organomegaly. Extremities: Extremities normal, atraumatic, no cyanosis or edema. Pulses: 2+ and symmetric all extremities. Skin: Skin color, texture, turgor normal. No rashes or lesions   Lymph nodes: Cervical, supraclavicular, and axillary nodes normal.     Labs reviewed.     CBC: No results found for: WBC, HGB, HGBEXT, HCT, HCTEXT, PLT, PLTEXT, HGBEXT, HCTEXT, PLTEXT  All Cardiac Markers in the last 24 hours: No results found for: CPK, CK, CKMMB, CKMB, RCK3, CKMBT, CKNDX, CKND1, IGOR, TROPT, TROIQ, DAHIANA, TROPT, TNIPOC, BNP, BNPP  ABG: No results found for: PH, PHI, PCO2, PCO2I, PO2, PO2I, HCO3, HCO3I, FIO2, FIO2I  COAGS: No results found for: APTT, PTP, INR  Pancreatic Markers: No results found for: AMYLPOCT, AML, LIPPOCT, LPSE

## 2019-06-12 NOTE — PROGRESS NOTES
Problem: Self Care Deficits Care Plan (Adult)  Goal: *Acute Goals and Plan of Care (Insert Text)  Description  Occupational Therapy Goals  Initiated 6/8/2019    1. Patient will utilize energy conservation techniques during functional activities with verbal cues within 7 day(s). 2.  Patient will perform upper body dressing and bathing with independence within 7 day(s). 3.  Patient will perform lower body dressing, using AE prn, with modified independence within 7 day(s). 4.  Patient will perform toilet transfers with modified independence within 7 day(s). 5.  Patient will perform all aspects of toileting with modified independence within 7 day(s). 6.  Patient will participate in upper extremity therapeutic exercise/activities with modified independence for 10-15 minutes within 7 day(s). Outcome: Progressing Towards Goal   OCCUPATIONAL THERAPY TREATMENT  Patient: Manda Rothman (53 y.o. female)  Date: 6/12/2019  Diagnosis: Perforated gastric ulcer ([facility] [K25.5] <principal problem not specified>  Procedure(s) (LRB):  LAPAROSCOPY PERFORATED GASTRIC ULCER REPAIR (N/A) 6 Days Post-Op  Precautions:    Chart, occupational therapy assessment, plan of care, and goals were reviewed. ASSESSMENT:  Pt seen for ADL re-training. She ambulated to bathroom and stood to brush her teeth as well as completed her own toileting needs. Pt verbalized having washed up earlier today. Pt is motivated to improve status. Encouraged her to cont to engage with UE exercises to increase strength and endurance for functional tasks. No further OT needs likely at discharge. Progression toward goals:  ?       Improving appropriately and progressing toward goals  ? Improving slowly and progressing toward goals  ? Not making progress toward goals and plan of care will be adjusted     PLAN:  Patient continues to benefit from skilled intervention to address the above impairments.   Continue treatment per established plan of care.  Discharge Recommendations:  Likely no further OT needs at discharge  Further Equipment Recommendations for Discharge:  None     SUBJECTIVE:   Patient stated ? I need my bedside commode close to the bed? OBJECTIVE DATA SUMMARY:   Cognitive/Behavioral Status:  Neurologic State: Alert; Appropriate for age  Orientation Level: Oriented X4  Cognition: Appropriate decision making             Functional Mobility and Transfers for ADLs:  Bed Mobility:   Mod I supine to sit    Transfers:     Functional Transfers  Toilet Transfer : Supervision       Balance:  Sitting: Intact  Standing: Without support    ADL Intervention:       Grooming  Brushing Teeth: Supervision(standing at sink). Pt stood for approximately 10 min to task             Therapeutic Exercises:   Pt encouraged to engage with UE exercises to increase strength and endurance for functional tasks. Pain:  Pain Scale 1: Numeric (0 - 10)  Pain Intensity 1: 0              Activity Tolerance:   Fair  Please refer to the flowsheet for vital signs taken during this treatment. After treatment:   ? Patient left in no apparent distress sitting up in chair  ? Patient left in no apparent distress in bed  ? Call bell left within reach  ? Nursing notified  ? Caregiver present  ?  Bed alarm activated    COMMUNICATION/COLLABORATION:   The patient?s plan of care was discussed with: Occupational Therapist and Registered Nurse    BOB Love  Time Calculation: 20 mins

## 2019-06-12 NOTE — PROGRESS NOTES
Bedside shift change report given to Zabrina Darby RN (oncoming nurse) by Tim Livingston RN (offgoing nurse). Report included the following information SBAR, MAR, Accordion and Recent Results.

## 2019-06-12 NOTE — PROGRESS NOTES
Bedside and Verbal shift change report given to Elyssa Mayfield (oncoming nurse) by Anna Gamboa (offgoing nurse). Report included the following information SBAR, Kardex, Intake/Output, MAR and Recent Results.

## 2019-06-12 NOTE — PROGRESS NOTES
Tolerating fulls. Advance to mechanical soft. Stop TPN. CT reviewed. If tolerating diet, home tomorrow.       Camron Ayala MD

## 2019-06-13 VITALS
RESPIRATION RATE: 16 BRPM | TEMPERATURE: 98.3 F | HEART RATE: 84 BPM | HEIGHT: 63 IN | DIASTOLIC BLOOD PRESSURE: 70 MMHG | WEIGHT: 115.2 LBS | SYSTOLIC BLOOD PRESSURE: 124 MMHG | OXYGEN SATURATION: 98 % | BODY MASS INDEX: 20.41 KG/M2

## 2019-06-13 LAB
ANION GAP SERPL CALC-SCNC: 5 MMOL/L (ref 5–15)
BUN SERPL-MCNC: 8 MG/DL (ref 6–20)
BUN/CREAT SERPL: 28 (ref 12–20)
CALCIUM SERPL-MCNC: 9.1 MG/DL (ref 8.5–10.1)
CHLORIDE SERPL-SCNC: 108 MMOL/L (ref 97–108)
CO2 SERPL-SCNC: 27 MMOL/L (ref 21–32)
CREAT SERPL-MCNC: 0.29 MG/DL (ref 0.55–1.02)
GLUCOSE BLD STRIP.AUTO-MCNC: 108 MG/DL (ref 65–100)
GLUCOSE BLD STRIP.AUTO-MCNC: 80 MG/DL (ref 65–100)
GLUCOSE BLD STRIP.AUTO-MCNC: 89 MG/DL (ref 65–100)
GLUCOSE SERPL-MCNC: 84 MG/DL (ref 65–100)
MAGNESIUM SERPL-MCNC: 2 MG/DL (ref 1.6–2.4)
PHOSPHATE SERPL-MCNC: 3.3 MG/DL (ref 2.6–4.7)
POTASSIUM SERPL-SCNC: 4 MMOL/L (ref 3.5–5.1)
SERVICE CMNT-IMP: ABNORMAL
SERVICE CMNT-IMP: NORMAL
SERVICE CMNT-IMP: NORMAL
SODIUM SERPL-SCNC: 140 MMOL/L (ref 136–145)

## 2019-06-13 PROCEDURE — 80048 BASIC METABOLIC PNL TOTAL CA: CPT

## 2019-06-13 PROCEDURE — 74011250637 HC RX REV CODE- 250/637: Performed by: SURGERY

## 2019-06-13 PROCEDURE — 84100 ASSAY OF PHOSPHORUS: CPT

## 2019-06-13 PROCEDURE — 83735 ASSAY OF MAGNESIUM: CPT

## 2019-06-13 PROCEDURE — 36415 COLL VENOUS BLD VENIPUNCTURE: CPT

## 2019-06-13 PROCEDURE — 82962 GLUCOSE BLOOD TEST: CPT

## 2019-06-13 RX ORDER — PANTOPRAZOLE SODIUM 20 MG/1
40 TABLET, DELAYED RELEASE ORAL DAILY
Qty: 30 TAB | Refills: 3 | Status: SHIPPED | OUTPATIENT
Start: 2019-06-13

## 2019-06-13 RX ORDER — OXYCODONE HYDROCHLORIDE 5 MG/1
5 TABLET ORAL
Qty: 30 TAB | Refills: 0 | Status: SHIPPED | OUTPATIENT
Start: 2019-06-13 | End: 2019-06-20

## 2019-06-13 RX ORDER — PANTOPRAZOLE SODIUM 20 MG/1
20 TABLET, DELAYED RELEASE ORAL DAILY
Qty: 30 TAB | Refills: 3 | Status: SHIPPED | OUTPATIENT
Start: 2019-06-13 | End: 2019-06-13 | Stop reason: SDUPTHER

## 2019-06-13 RX ORDER — PROMETHAZINE HYDROCHLORIDE 12.5 MG/1
25 TABLET ORAL
Qty: 30 TAB | Refills: 1 | Status: SHIPPED | OUTPATIENT
Start: 2019-06-13

## 2019-06-13 RX ADMIN — PANTOPRAZOLE SODIUM 40 MG: 40 TABLET, DELAYED RELEASE ORAL at 07:56

## 2019-06-13 RX ADMIN — OXYCODONE HYDROCHLORIDE 5 MG: 5 TABLET ORAL at 15:25

## 2019-06-13 NOTE — PROGRESS NOTES
Nutrition Assessment:    RECOMMENDATIONS/INTERVENTION(S):   1. Recommend GI lite diet. 2. Continue Barry TID (240 kcal +gluamine and arginine) and Gelatein TID (270 kcal and 60 gm protein). 3. Encourage/ monitor PO intake. 4. Monitor labs and weight trends. ASSESSMENT:   6/13: Pt seen for f/u. Pt advanced to mechanical soft diet. Pt reports poor appetite with ~30% meal intake for breakfast this AM. Pt experiencing some abdominal pain as well as nausea. Pt drinking Barry TID, unflavored in water. Pt requesting box to take home. Unable to provide this service but provided couple packets to take home. Pt ordered for Gelatein, has not tried it - encouraged pt to try. Pt not willing to try another ONS as she does not like sweet foods.  lb. Noted weight gain since previous assessment. Desirable as pt is underweight for age based on BMI. Pt reports some constipation. LBM 6/12. Labs and medications reviewed. 6/10: Pt seen for f/u. Noted pt advanced to CLD today, TPN d/c'd. Pt reports poor PO intake PTA d/t stomach pain.  lb. BMI 18.6 c/w underweight for age. Pt reports weighing 140 lb x 2-3 mo ago, indicating 25% weight loss which is severe for timeframe. Pt appears to have experienced muscle wasting. Pt has tried Ensure in the past and did not tolerate well, likes Barry. Will add Barry TID and Gelatein TID to promote intake. No note of GI distress. LBM 6/5. Labs and medications reviewed. 6/7: 74 y/o F admitted with perforated gastric ulcer. MD consult for perforated gastric ulcer and low BMI. PMH - gastric ulcer, hyperlipidemia, HTN. S/p laparoscopy perforated gastric ulcer repair 6/6. Per MD notes, pt with 40 lb weight loss x 2 mo, indicating severe weight loss 28% x 2 mo. No weight Hx per EMR.  lb. BMI 17.9 c/w underweight. Suspect limited PO intake r/t poor appetite, concern for refeeding. Estimated energy needs +250kcal to promote healthy weight. LBM 6/5.  No note of n/v/c/d. Labs: Ca 8.3 L. Meds: ondansetron, pantoprazole, piperacillin-tazobactam.       Diet Order:  Mechanical Soft  % Eaten:    Patient Vitals for the past 72 hrs:   % Diet Eaten   06/13/19 0238 100 %       Pertinent Medications: [x] Reviewed    Labs: [x] Reviewed    Anthropometrics: Height: 5' 3\" (160 cm) Weight: 52.3 kg (115 lb 3.2 oz)    IBW (%IBW):   ( ) UBW (%UBW):   (  %)      BMI: Body mass index is 20.41 kg/m². This BMI is indicative of:   [x] Underweight for age    [] Normal    [] Overweight    []  Obesity    []  Extreme Obesity (BMI>40)  Estimated Nutrition Needs (Based on): 7657 Kcals/day(REE 1078 x 1.2 + 250kcal ) , 60 g(55-64 (1.2 - 1.4 gm/kg)) Protein  Carbohydrate: At Least 130 g/day  Fluids: 1544 mL/day (1 ml/kcal)    Last BM: 6/12   []Active     []Hyperactive  [x]Hypoactive       [] Absent   BS  Skin:    [] Intact   [x] Incision - abdomen  [] Breakdown   [] DTI   [] Tears/Excoriation/Abrasion  []Edema [] Other: Wt Readings from Last 30 Encounters:   06/13/19 52.3 kg (115 lb 3.2 oz)      NUTRITION DIAGNOSES:   Problem:  Altered GI function     Etiology: related to alteration in GI sturucture/ function     Signs/Symptoms: as evidenced by perforated gastric ulcer       6/13: Nutrition Dx  Improved.      NUTRITION INTERVENTIONS:    Enteral/Parenteral Nutrition: Initiate parenteral nutrition                GOAL:   Pt will tolerate and consume >75% meal intake within 3-5 days    Cultural, Anabaptist, or Ethnic Dietary Needs:   None     EDUCATION & DISCHARGE NEEDS:    [x] None Identified   [] Identified and Education Provided/Documented   [] Identified and Pt declined/was not appropriate      [x] Interdisciplinary Care Plan Reviewed/Documented    [x] Discharge Needs:    GI lite diet   [] No Nutrition Related Discharge Needs    NUTRITION RISK:   Pt Is At Nutrition Risk  [x]     No Nutrition Risk Identified  []       PT SEEN FOR:    []  MD Consult: []Calorie Count      []Diabetic Diet Education        []Diet Education     []Electrolyte Management     []General Nutrition Management and Supplements     []Management of Tube Feeding     []TPN Recommendations    []  RN Referral:  []MST score >=2     []Enteral/Parenteral Nutrition PTA     []Pregnant: Gestational DM or Multigestation                 [] Pressure Ulcer    []  Low BMI      []  Length of Stay       [] Dysphagia Diet         [] Ventilator  [x]  Follow-up     Previous Recommendations:   [x] Implemented          [] Not Implemented          [] Not Applicable    Previous Goal:   [] Met              [x] Progressing Towards Goal              [] Not Progressing Towards Goal   [] Not Applicable            Ivan Ruiz, [address] [place] [address]  Pager [phone]  Phone [phone]

## 2019-06-13 NOTE — DISCHARGE INSTRUCTIONS
GENERAL POST-OPERATIVE  PATIENT INSTRUCTIONS      FOLLOW-UP:  Please make an appointment with your physician in 1 week(s). Call your physician immediately if you have any fevers greater than 102.5, drainage from you wound that is not clear or looks infected, persistent bleeding, increasing abdominal pain, problems urinating, or persistent nausea/vomiting. WOUND CARE INSTRUCTIONS:  Keep a dry clean dressing on the wound if there is drainage. The initial bandage may be removed after 24 hours. Once the wound has quit draining you may leave it open to air. If clothing rubs against the wound or causes irritation and the wound is not draining you may cover it with a dry dressing during the daytime. Try to keep the wound dry and avoid ointments on the wound unless directed to do so. If the wound becomes bright red and painful or starts to drain infected material that is not clear, please contact your physician immediately. If the wound is mildly pink and has a thick firm ridge underneath it, this is normal, and is referred to as a healing ridge. This will resolve over the next 4-6 weeks. DIET:  You may eat any foods that you can tolerate. It is a good idea to eat a high fiber diet and take in plenty of fluids to prevent constipation. If you do become constipated you may want to take a mild laxative or take ducolax tablets on a daily basis until your bowel habits are regular. Constipation can be very uncomfortable, along with straining, after recent surgery. ACTIVITY:  You are encouraged to cough and deep breath or use your incentive spirometer if you were given one, every 15-30 minutes when awake. This will help prevent respiratory complications and low grade fevers post-operatively if you had a general anesthetic. You may want to hug a pillow when coughing and sneezing to add additional support to the surgical area, if you had abdominal or chest surgery, which will decrease pain during these times. You are encouraged to walk and engage in light activity for the next two weeks. You should not lift more than 20 pounds during this time frame as it could put you at increased risk for complications. Twenty pounds is roughly equivalent to a plastic bag of groceries. MEDICATIONS:  Try to take narcotic medications and anti-inflammatory medications, such as tylenol, ibuprofen, naprosyn, etc., with food. This will minimize stomach upset from the medication. Should you develop nausea and vomiting from the pain medication, or develop a rash, please discontinue the medication and contact your physician. You should not drive, make important decisions, or operate machinery when taking narcotic pain medication. QUESTIONS:  Please feel free to call your physician or the hospital  if you have any questions, and they will be glad to assist you.

## 2019-06-13 NOTE — CDMP QUERY
Good Afternoon~ Patient admitted with BMI @ 17.89  and RD was consulted and notes pt is Underweight. Attending progress notes indicates Severe malnutrition. If possible, can you  please also document in progress notes and d/c summary if you are evaluating and/or treating any of the following and if you also agree with the  
 
=> Underweight (POA) =>Cachexia (POA) =>Failure to Thrive (POA) =>Other explanation of clinical findings =>Clinically Undetermined (no explanation for clinical findings) The medical record reflects the following: 
   Risk Factors: Perforated gasric ulcer. Clinical Indicators:Pt has 40bls weight loss in last 2 months . decrased po intake , poor appetitie. BMI @ 17.89 Treatment:RD consulted--- Monioring of labs and weight trends, advance diet as tolerate. Recommend Barry TID , Gelatein Cllose monitoring po intake Please clarify and document your clinical opinion in the progress notes and discharge summary, including the definitive and or presumptive diagnosis, (suspected or probable), related to the above clinical findings. Please include clinical findings supporting your diagnosis. Thank you, Jonathan Douglas RN, [facility] 
[phone]

## 2019-06-13 NOTE — DISCHARGE SUMMARY
Discharge Summary    Patient: Camacho Singh               Sex: female          DOA: 6/6/2019  1:53 PM       YOB: 1955      Age:  75 y.o.        LOS:  LOS: 7 days                Discharge Date:      Admission Diagnoses: Perforated gastric ulcer ([facility] [K25.5]    Discharge Diagnoses:  Same    Procedure:  Procedure(s):  LAPAROSCOPY PERFORATED GASTRIC ULCER REPAIR    Discharge Condition: Good    Hospital Course: Unremarkable operative procedure. Discharge to home in stable condition. Consults: None    Significant Diagnostic Studies: See full electronic record. Discharge Medications:     Current Discharge Medication List      START taking these medications    Details   oxyCODONE IR (ROXICODONE) 5 mg immediate release tablet Take 1 Tab by mouth every four (4) hours as needed for Pain for up to 7 days. Max Daily Amount: 30 mg. Indications: Pain, Acute post-operative pain  Qty: 30 Tab, Refills: 0    Associated Diagnoses: Chronic gastric ulcer with perforation (HCC)      pantoprazole (PROTONIX) 20 mg tablet Take 2 Tabs by mouth daily. Indications: stomach ulcer  Qty: 30 Tab, Refills: 3      promethazine (PHENERGAN) 12.5 mg tablet Take 2 Tabs by mouth every six (6) hours as needed for Nausea. Indications: Nausea and Vomiting After Surgery  Qty: 30 Tab, Refills: 1      iioqx-wzla-VxWCM-collag-mv-min (STUART, WITH COLLAGEN,) 7-7-1.5 gram pwpk Take 1 Each by mouth two (2) times a day. Qty: 30 Packet, Refills: 4    Associated Diagnoses: Moderate protein-calorie malnutrition ([facility]         CONTINUE these medications which have NOT CHANGED    Details   PARoxetine (PAXIL) 10 mg tablet Take 10 mg by mouth daily. rosuvastatin (CRESTOR) 10 mg tablet Take 10 mg by mouth nightly. triamterene-hydroCHLOROthiazide (DYAZIDE) 37.5-25 mg per capsule Take 1 Cap by mouth daily. cholecalciferol (VITAMIN D3) 1,000 unit tablet Take 1,000 Units by mouth daily.       calcium carbonate (OS-DAVID) 500 mg calcium (1,250 mg) tablet Take 1 Tab by mouth daily. sucralfate (CARAFATE) 100 mg/mL suspension Take 1 g by mouth four (4) times daily. diclofenac (VOLTAREN) 1 % gel Apply 4 g to affected area four (4) times daily as needed for Pain.      gabapentin (NEURONTIN) 300 mg capsule Take 300 mg by mouth nightly as needed for Other (neuropathic pain). traMADol (ULTRAM) 50 mg tablet Take 50 mg by mouth daily as needed for Pain (severe pain). STOP taking these medications       esomeprazole (NEXIUM) 40 mg capsule Comments:   Reason for Stopping:         ondansetron (ZOFRAN ODT) 4 mg disintegrating tablet Comments:   Reason for Stopping:               Activity/Diet/Wound Care: See patient administered discharge instructions.     Follow-up: 1 weeks    Anahi Cummins MD  [facility]  Office:  [phone]  Fax:  [phone]

## 2019-06-13 NOTE — PROGRESS NOTES
PCP [facility] appt scheduled with Dr. Eliecer Harvey on 6/18/2019 at 9:00am Appt added to AVS. Rachel Ro CM Specialist

## 2019-06-13 NOTE — PROGRESS NOTES
6/13/2019  11:34 AM  Pt DC noted. No DC service needs indicated. Barry Rx submitted to pharmacy; however, a prior auth request was submitted to attending's office. Clement Kilpatrick can be purchased over the counter. Pt will be seen for outpatient follow-up upon discharge. No additional DC service needs indicated.

## 2019-06-21 NOTE — ADT AUTH CERT NOTES
6/11/19-6/12/19 Additional Clinical by Colleen Crespo Review Status Review Entered In Primary 6/20/2019 13:50 Criteria Review 6/11/19-6/12/19 Clinical 
 
  
 
6/11/19 General Surgical Note Patient sleeping. Tolerating diet, advance to full liquids. CT C/A/P pending. XR images knee reviewed. Once eating normally will work on discharge 6/12/19 General Surgical Note Tolerating fulls. Advance to mechanical soft. Stop TPN. CT reviewed. If tolerating diet, home tomorrow. Vitals: 
 
6/11/19 98.3, 74, 16, 94%, 152/71 
 
6/12/19 98.2, 66, 18, 97%, 147/75 Medications Administered   
 
 
   
 
 
       
 
acetaminophen (TYLENOL) suppository 650 mg   
 
 
   
 
Admin Date 
 
06/10/2019 Action Given Dose 650 mg  
 
Route Rectal  
 
Administered By 
 
[facility]  
 
 
   
 
  
 
 
   
 
 
       
 
ceFAZolin (ANCEF) 2 g/20 mL in sterile water IV syringe Admin Date 
 
06/06/2019 Action Given Dose 
 
2 g Route IntraVENous Administered By 
 
Carmen Jimenez RN  
 
 
   
 
  
 
 
   
 
 
        
 
fat emulsion 20% (LIPOSYN, INTRAlipid) infusion 500 mL Admin Date 
 
06/07/2019 Action Patient/Family Admin Dose 
 
500 mL Rate 
 
41.67 mL/hr  
 
Route IntraVENous Administered By 
 
Claire Felipe RN Admin Date 
 
06/10/2019 Action Patient/Family Admin Dose 
 
500 mL Rate 
 
41.67 mL/hr  
 
Route IntraVENous Administered By Vadim Robertson RN  
 
 
   
 
  
 
 
   
 
 
       
 
gabapentin (NEURONTIN) 250 mg/5 mL solution 250 mg   
 
 
   
 
Admin Date 
 
06/10/2019 Action Given Dose 
 
250 mg  
 
Route Oral  
 
Administered By Vadim Robertson RN Admin Date 
 
06/11/2019 Action Given Dose 
 
250 mg  
 
Route Oral Administered By 
 
Manuela Jj RN  
 
 
   
 
  
 
 
   
 
 
       
 
HYDROmorphone (PF) (DILAUDID) injection 1 mg Admin Date 
 
06/06/2019 Action Given Dose 
 
1 mg Route IntraVENous Administered By 
 
Rody Arriaga RN Admin Date 
 
06/06/2019 Action Given Dose 
 
1 mg Route IntraVENous Administered By 
 
Rody Arriaga RN  
 
 
   
 
  
 
 
   
 
 
       
 
HYDROmorphone (PF) 25 mg/50 mL (DILAUDID) PCA Admin Date 
 
06/06/2019 Action New Bag Dose Route IntraVENous Administered By 
 
Juaquin Escudero RN Admin Date 
 
06/06/2019 Action Rate Verify Dose Route IntraVENous Administered By 
 
Wilder Smith RN Admin Date 
 
06/07/2019 Action Rate Verify Dose Route IntraVENous Administered By 
 
Venita Moffett RN Admin Date 
 
06/07/2019 Action New Bag Dose Route IntraVENous Administered By 
 
Venita Moffett RN Admin Date 
 
06/07/2019 Action Rate Verify Dose Route IntraVENous Administered By 
 
Venita Moffett RN Admin Date 
 
06/08/2019 Action Rate Verify Dose Route IntraVENous Administered By Rashad Calloway Admin Date 
 
06/08/2019 Action Rate Verify Dose Route IntraVENous Administered By 
 
Diana Patel Admin Date 
 
06/09/2019 Action Rate Verify Dose Route IntraVENous Administered By 
 
Diana Patel Admin Date 
 
06/09/2019 Action Rate Verify Dose Route IntraVENous Administered By 
 
Christine Phillip Admin Date 
 
06/10/2019 Action Rate Verify Dose Route IntraVENous Administered By 
 
Christine Phillip Admin Date 
 
06/10/2019 Action Rate Verify Dose Route IntraVENous Administered By 
 
Christine Phillip Admin Date 
 
06/11/2019 Action Rate Verify Dose Route IntraVENous Administered By Danyel Napier RN Admin Date 
 
06/11/2019 Action Rate Verify Dose Route IntraVENous Administered By 
 
Talia Bright Admin Date 
 
06/11/2019 Action Rate Verify Dose Route IntraVENous Administered By 
 
Sánchez Bell RN Admin Date 
 
06/12/2019 Action Rate Verify Dose Route IntraVENous Administered By 
 
Talia Bright  
 
 
   
 
  
 
 
   
 
 
       
 
iohexol (OMNIPAQUE) 240 mg iodine/mL solution 50 mL Admin Date 
 
06/11/2019 Action Given Dose 50 mL Route Oral  
 
Administered By 
 
Talia Bright  
 
 
   
 
  
 
 
   
 
 
       
 
iopamidol (ISOVUE-370) 76 % injection 100 mL Admin Date 
 
06/11/2019 Action Given Dose 
 
100 mL Route IntraVENous Administered By Maynor Ty  
 
 
   
 
  
 
 
   
 
 
        
 
lactated Ringers infusion Admin Date 
 
06/06/2019 Action New Bag Dose 
 
125 mL/hr  
 
Rate 125 mL/hr  
 
Route IntraVENous Administered By Jack Burger RN Admin Date 
 
06/06/2019 Action New Bag Dose 
 
125 mL/hr  
 
Rate 125 mL/hr  
 
Route IntraVENous Administered By 
 
Kings Crane RN  
 
 
   
 
 
 
 Admin Date 
 
06/08/2019 Action New Bag Dose 
 
125 mL/hr  
 
Rate 125 mL/hr  
 
Route IntraVENous Administered By 
 
Ilir Bowen Admin Date 
 
06/08/2019 Action New Bag Dose 
 
125 mL/hr  
 
Rate 125 mL/hr  
 
Route IntraVENous Administered By Xiomy Jain RN Admin Date 
 
06/09/2019 Action New Bag Dose 
 
125 mL/hr  
 
Rate 125 mL/hr  
 
Route IntraVENous Administered By 
 
Ilir Bowen Admin Date 
 
06/10/2019 Action New Bag Dose 
 
125 mL/hr  
 
Rate 125 mL/hr  
 
Route IntraVENous Administered By Xiomy Jain RN Admin Date 
 
06/10/2019 Action New Bag Dose 
 
125 mL/hr  
 
Rate 125 mL/hr  
 
Route IntraVENous Administered By 
 
Ilir Bowen  
 
 
   
 
  
 
 
   
 
 
       
 
metoclopramide HCl (REGLAN) injection 5 mg Admin Date 
 
06/09/2019 Action Given Dose 5 mg Route IntraVENous Administered By Xiomy Jain RN Admin Date 
 
06/09/2019 Action Given Dose 5 mg Route IntraVENous Administered By Xiomy Jain RN  
 
 
   
 
  
 
 
   
 
 
       
 
ondansetron [facility]) injection 4 mg Admin Date 
 
06/06/2019 Action Given Dose 4 mg Route IntraVENous Administered By 
 
Emily Gonsalez RN Admin Date 
 
06/07/2019 Action Given Dose 4 mg Route IntraVENous Administered By 
 
Cuca Lindsay RN Admin Date 
 
06/07/2019 Action Given Dose 4 mg Route IntraVENous Administered By 
 
Cuca Lindsay RN Admin Date 06/07/2019 Action Given Dose 4 mg Route IntraVENous Administered By 
 
Genaro Bowden RN Admin Date 
 
06/07/2019 Action Given Dose 4 mg Route IntraVENous Administered By [facility] Admin Date 
 
06/08/2019 Action Given Dose 4 mg Route IntraVENous Administered By [facility] Admin Date 
 
06/08/2019 Action Given Dose 4 mg Route IntraVENous Administered By [facility] Admin Date 
 
06/08/2019 Action Given Dose 4 mg Route IntraVENous Administered By 
 
Mendez Brooking Admin Date 
 
06/09/2019 Action Given Dose 4 mg Route IntraVENous Administered By 
 
Mendez Brooking Admin Date 
 
06/10/2019 Action Given Dose 4 mg Route IntraVENous Administered By 
 
Mendez Brooking  
 
 
   
 
  
 
 
   
 
 
       
 
oxyCODONE IR (ROXICODONE) tablet 10 mg   
 
 
   
 
Admin Date 
 
06/12/2019 Action Given Dose 
 
10 mg  
 
Route Oral  
 
Administered By 
 
Sanjay Kidd RN  
 
 
   
 
  
 
 
   
 
 
       
 
oxyCODONE IR (ROXICODONE) tablet 5 mg Admin Date 
 
06/13/2019 Action Given Dose 5 mg Route Oral  
 
Administered By 
 
Abdiaziz Calvin  
 
 
   
 
  
 
 
   
 
 
       
 
pantoprazole (PROTONIX) 40 mg in sodium chloride 0.9% 10 mL injection Admin Date 
 
06/06/2019 Action Given Dose 40 mg  
 
Route IntraVENous Administered By 
 
Meryle Pearson, RN Admin Date 
 
06/07/2019 Action Given Dose 40 mg  
 
Route IntraVENous Administered By 
 
Genaro Bowden, RN  
 
 
   
 
 
 
   
 
  
 
 
   
 
Admin Date 
 
06/07/2019 Action Given Dose 40 mg  
 
Route IntraVENous Administered By Compa Martino Admin Date 
 
06/08/2019 Action Given Dose 40 mg  
 
Route IntraVENous Administered By 
 
Carie Lax Admin Date 
 
06/08/2019 Action Given Dose 40 mg  
 
Route IntraVENous Administered By Joycelyn Chang, RN Admin Date 
 
06/09/2019 Action Given Dose 40 mg  
 
Route IntraVENous Administered By 
 
Carie Lax Admin Date 
 
06/09/2019 Action Given Dose 40 mg  
 
Route IntraVENous Administered By Joycelyn Chang, RN Admin Date 
 
06/10/2019 Action Given Dose 40 mg  
 
Route IntraVENous Administered By 
 
Carie Lax Admin Date 
 
06/10/2019 Action Given Dose 40 mg  
 
Route IntraVENous Administered By Chanel Ratliff, RN Admin Date 
 
06/11/2019 Action Given Dose 40 mg  
 
Route IntraVENous Administered By 
 
Edelmira Smith Admin Date 
 
06/11/2019 Action Given Dose 40 mg  
 
Route IntraVENous Administered By 
 
Octaviano Simons, RN Admin Date 
 
06/12/2019 Action Given Dose 40 mg  
 
Route IntraVENous Administered By 
 
Edelmira Smith  
 
 
   
 
  
 
 
   
 
 
       
 
pantoprazole (PROTONIX) tablet 40 mg   
 
 
   
 
Admin Date 
 
06/13/2019 Action Given Dose 40 mg  
 
Route Oral  
 
Administered By Camryn Flores, RN  
 
 
   
 
  
 
 
   
 
 
        
 
piperacillin-tazobactam (ZOSYN) 3.375 g in 0.9% sodium chloride (MBP/ADV) 100 mL Admin Date 
 
06/06/2019 Action New Bag Dose 3.375 g Rate 25 mL/hr  
 
Route IntraVENous Administered By 
 
Fabio Joe RN Admin Date 
 
06/07/2019 Action New Bag Dose 3.375 g Rate 25 mL/hr  
 
Route IntraVENous Administered By 
 
Fabio Joe RN Admin Date 
 
06/07/2019 Action New Bag Dose 3.375 g Rate 25 mL/hr  
 
Route IntraVENous Administered By 
 
Benjamin Richardson RN Admin Date 
 
06/07/2019 Action New Bag Dose 3.375 g Rate 25 mL/hr  
 
Route IntraVENous Administered By Estrada Sparks Admin Date 
 
06/08/2019 Action New Bag Dose 3.375 g Rate 25 mL/hr  
 
Route IntraVENous Administered By Estrada Sparks Admin Date 
 
06/08/2019 Action New Bag Dose 3.375 g Rate 25 mL/hr  
 
Route IntraVENous Administered By 
 
Shannon Bueno Admin Date 
 
06/08/2019 Action New Bag Dose 3.375 g Rate 25 mL/hr  
 
Route IntraVENous Administered By Cruz Choi RN Admin Date 
 
06/09/2019 Action New Bag Dose 3.375 g Rate 25 mL/hr  
 
Route IntraVENous Administered By Cruz Choi RN Admin Date 
 
06/09/2019 Action New Bag Dose 3.375 g Rate 25 mL/hr  
 
Route IntraVENous Administered By 
 
Shannon Bueno Admin Date 
 
06/09/2019 Action New Bag Dose 3.375 g Rate 25 mL/hr  
 
Route IntraVENous Administered By Cruz Choi RN  
 
 
   
 
 
 
   
 
  
 
 
   
 
Admin Date 
 
06/10/2019 Action New Bag Dose 3.375 g Rate 25 mL/hr  
 
Route IntraVENous Administered By Denny Venegas, RN Admin Date 
 
06/10/2019 Action New Bag Dose 3.375 g Rate 25 mL/hr  
 
Route IntraVENous Administered By 
 
Dafne Bermudez  
 
 
   
 
  
 
 
   
 
 
        
 
potassium phosphate 30 mmol in 0.9% sodium chloride 250 mL infusion Admin Date 
 
06/08/2019 Action Given Dose Rate 43.3 mL/hr  
 
Route IntraVENous Administered By 
 
[facility] Admin Date 
 
06/09/2019 Action Given Dose Rate 43.3 mL/hr  
 
Route IntraVENous Administered By 
 
[facility]  
 
 
   
 
  
 
 
   
 
 
       
 
prochlorperazine (COMPAZINE) injection 5 mg Admin Date 
 
06/07/2019 Action Given Dose 5 mg Route IntraVENous Administered By 
 
Maurice Saini RN Admin Date 
 
06/08/2019 Action Given Dose 5 mg Route IntraVENous Administered By 
 
Dafne Bermudez Admin Date 
 
06/11/2019 Action Given Dose 5 mg Route IntraVENous Administered By Oscar Up, RN Admin Date 
 
06/12/2019 Action Given Dose 5 mg Route IntraVENous Administered By 
 
David Workman RN  
 
 
   
 
  
 
 
   
 
 
       
 
sodium chloride 0.9 % bolus infusion 1,000 mL Admin Date 
 
06/06/2019 Action New Bag Dose 
 
1000 mL Route IntraVENous Administered By 
 
Justin Murguia RN  
 
 
   
 
  
 
 
   
 
 
        
 
TPN ADULT - CENTRAL Admin Date 
 
06/08/2019 Action New Bag Dose Rate 42 mL/hr  
 
Route IntraVENous Administered By 
 
Sophy Room Admin Date 
 
06/09/2019 Action New Bag Dose Rate 42 mL/hr  
 
Route IntraVENous Administered By 
 
Sophy Room Admin Date 
 
06/11/2019 Action New Bag Dose Rate 42 mL/hr  
 
Route IntraVENous Administered By 
 
Amado Sommers TPN ADULT - CENTRAL AA 5% D20% W/ ELECTROLYTES AND CA Admin Date 
 
06/07/2019 Action New Bag Dose Rate 21 mL/hr  
 
Route IntraVENous Administered By 
 
Claire Felipe RN Admin Date 
 
06/10/2019 Action New Bag Dose Rate 42 mL/hr  
 
Route IntraVENous Administered By Vadim Robertson RN  
 
 
   
 
  
 
 
   
 
 
  
 
  
 
  
 
 
   
 
6/11/2019 03:20  
 
6/12/2019 04:30 Potassium 3.2 (L)  
 
4.0 Anion gap  
 
5  
 
2 (L) Glucose 113 (H) 92  
 
 
Creatinine  
 
0.32 (L)  
 
0.28 (L) BUN/Creatinine ratio 28 (H) 29 (H) Phosphorus 2.3 (L)  
 
3.2 6/11/19 CT chest-1. Postsurgical changes left upper quadrant, with small amount of free air and free fluid. Gastric wall thickening. Mildly enlarged adjacent left upper quadrant lymph nodes. 2. Bibasilar atelectasis and small pleural effusions. No suspicious pulmonary nodule. No thoracic lymphadenopathy. 3. Streak artifact related to extensive colonic barium slightly degrades imaging. No bowel obstruction. 4. Mild, chronic appearing compression fractures T4 and T5. No suspicious or destructive bone lesion. R knee XR- Mild osteoarthrosis.

## 2019-06-26 ENCOUNTER — OP HISTORICAL/CONVERTED ENCOUNTER (OUTPATIENT)
Dept: OTHER | Age: 64
End: 2019-06-26

## 2022-03-30 NOTE — ROUTINE PROCESS
Bedside shift change report given to [address] [place] [address]. Mika Shipman (oncoming nurse) by Divya Still RN  (offgoing nurse). Report included the following information SBAR, MAR, Accordion and Recent Results.
car

## (undated) DEVICE — MARYLAND JAW LAPAROSCOPIC SEALER/DIVIDER COATED: Brand: LIGASURE

## (undated) DEVICE — PAD,NON-ADHERENT,3X8,STERILE,LF,1/PK: Brand: MEDLINE

## (undated) DEVICE — REM POLYHESIVE ADULT PATIENT RETURN ELECTRODE: Brand: VALLEYLAB

## (undated) DEVICE — NEEDLE HYPO 22GA L1.5IN BLK S STL HUB POLYPR SHLD REG BVL

## (undated) DEVICE — STERILE POLYISOPRENE POWDER-FREE SURGICAL GLOVES: Brand: PROTEXIS

## (undated) DEVICE — SUT SLK 0 30IN SH BLK --

## (undated) DEVICE — SYR 10ML LUER LOK 1/5ML GRAD --

## (undated) DEVICE — DRAIN SURG WND 15 FR RND TIP SIL STRL LF DISP

## (undated) DEVICE — SOL IRRIGATION INJ NACL 0.9% 500ML BTL

## (undated) DEVICE — SURGICAL PROCEDURE KIT GEN LAPAROSCOPY LF

## (undated) DEVICE — Device

## (undated) DEVICE — DRESSING FOAM DISK DIA1IN H 7MM HYDRPHLC CHG IMPREG IN SL

## (undated) DEVICE — SUTURE ETHLN SZ 2-0 L18IN NONABSORBABLE BLK L26MM FS 3/8 664G

## (undated) DEVICE — TOWEL SURG W17XL27IN STD BLU COT NONFENESTRATED PREWASHED

## (undated) DEVICE — 3M™ TEGADERM™ TRANSPARENT FILM DRESSING FRAME STYLE, 1624W, 2-3/8 IN X 2-3/4 IN (6 CM X 7 CM), 100/CT 4CT/CASE: Brand: 3M™ TEGADERM™

## (undated) DEVICE — SUTURE MCRYL SZ 4-0 L27IN ABSRB UD L19MM PS-2 1/2 CIR PRIM Y426H

## (undated) DEVICE — SUTURE DEV SZ 2-0 WND CLSR ABSRB GS-22 VLOC COVIDIEN VLOCM2145

## (undated) DEVICE — TUBING INSUFLTN 10FT LUER -- CONVERT TO ITEM 368568

## (undated) DEVICE — TRAY CATH OD16FR SIL URIN M STATLOK STBL DEV SURSTP

## (undated) DEVICE — TROCAR: Brand: KII SLEEVE

## (undated) DEVICE — TROCAR: Brand: KII FIOS FIRST ENTRY

## (undated) DEVICE — STRAP,POSITIONING,KNEE/BODY,FOAM,4X60": Brand: MEDLINE

## (undated) DEVICE — 3000CC GUARDIAN II: Brand: GUARDIAN

## (undated) DEVICE — COVER LT HNDL PLAS RIG 1 PER PK

## (undated) DEVICE — (D)PREP SKN CHLRAPRP APPL 26ML -- CONVERT TO ITEM 371833

## (undated) DEVICE — DRAPE,REIN 53X77,STERILE: Brand: MEDLINE

## (undated) DEVICE — CLICKLINE SCISSORS INSERT: Brand: CLICKLINE

## (undated) DEVICE — INFECTION CONTROL KIT SYS

## (undated) DEVICE — SUTURE ETHLN SZ 2-0 L18IN NONABSORBABLE BLK L19MM PS-2 PRIM 593H

## (undated) DEVICE — SUTURE SZ 0 27IN 5/8 CIR UR-6  TAPER PT VIOLET ABSRB VICRYL J603H

## (undated) DEVICE — 3M™ WARMING BLANKET, FULL BODY, 10 PER CASE, 40068: Brand: BAIR HUGGER™

## (undated) DEVICE — STRIP,CLOSURE,WOUND,MEDI-STRIP,1/2X4: Brand: MEDLINE